# Patient Record
Sex: MALE | Race: WHITE | Employment: OTHER | URBAN - METROPOLITAN AREA
[De-identification: names, ages, dates, MRNs, and addresses within clinical notes are randomized per-mention and may not be internally consistent; named-entity substitution may affect disease eponyms.]

---

## 2020-01-26 ENCOUNTER — HOSPITAL ENCOUNTER (EMERGENCY)
Age: 25
Discharge: HOME | End: 2020-01-26
Attending: EMERGENCY MEDICINE

## 2020-01-26 ENCOUNTER — APPOINTMENT (OUTPATIENT)
Dept: GENERAL RADIOLOGY | Age: 25
End: 2020-01-26

## 2020-01-26 VITALS
TEMPERATURE: 98.3 F | SYSTOLIC BLOOD PRESSURE: 113 MMHG | BODY MASS INDEX: 22.5 KG/M2 | WEIGHT: 140 LBS | HEIGHT: 66 IN | HEART RATE: 88 BPM | RESPIRATION RATE: 13 BRPM | OXYGEN SATURATION: 98 % | DIASTOLIC BLOOD PRESSURE: 73 MMHG

## 2020-01-26 LAB
ABSOLUTE EOS #: 0 K/UL (ref 0–0.4)
ABSOLUTE IMMATURE GRANULOCYTE: ABNORMAL K/UL (ref 0–0.3)
ABSOLUTE LYMPH #: 0.5 K/UL (ref 1–4.8)
ABSOLUTE MONO #: 0.4 K/UL (ref 0.1–1.3)
ALBUMIN SERPL-MCNC: 4.5 G/DL (ref 3.5–5.2)
ALBUMIN/GLOBULIN RATIO: ABNORMAL (ref 1–2.5)
ALP BLD-CCNC: 70 U/L (ref 40–129)
ALT SERPL-CCNC: 9 U/L (ref 5–41)
ANION GAP SERPL CALCULATED.3IONS-SCNC: 18 MMOL/L (ref 9–17)
AST SERPL-CCNC: 21 U/L
BASOPHILS # BLD: 1 % (ref 0–2)
BASOPHILS ABSOLUTE: 0 K/UL (ref 0–0.2)
BILIRUB SERPL-MCNC: 0.48 MG/DL (ref 0.3–1.2)
BUN BLDV-MCNC: 19 MG/DL (ref 6–20)
BUN/CREAT BLD: ABNORMAL (ref 9–20)
CALCIUM SERPL-MCNC: 8.9 MG/DL (ref 8.6–10.4)
CHLORIDE BLD-SCNC: 98 MMOL/L (ref 98–107)
CO2: 19 MMOL/L (ref 20–31)
CREAT SERPL-MCNC: 0.85 MG/DL (ref 0.7–1.2)
DIFFERENTIAL TYPE: ABNORMAL
DIRECT EXAM: ABNORMAL
DIRECT EXAM: ABNORMAL
EOSINOPHILS RELATIVE PERCENT: 0 % (ref 0–4)
GFR AFRICAN AMERICAN: >60 ML/MIN
GFR NON-AFRICAN AMERICAN: >60 ML/MIN
GFR SERPL CREATININE-BSD FRML MDRD: ABNORMAL ML/MIN/{1.73_M2}
GFR SERPL CREATININE-BSD FRML MDRD: ABNORMAL ML/MIN/{1.73_M2}
GLUCOSE BLD-MCNC: 93 MG/DL (ref 70–99)
HCT VFR BLD CALC: 41.9 % (ref 41–53)
HEMOGLOBIN: 14.4 G/DL (ref 13.5–17.5)
IMMATURE GRANULOCYTES: ABNORMAL %
LIPASE: 15 U/L (ref 13–60)
LYMPHOCYTES # BLD: 11 % (ref 24–44)
Lab: ABNORMAL
MCH RBC QN AUTO: 31 PG (ref 26–34)
MCHC RBC AUTO-ENTMCNC: 34.2 G/DL (ref 31–37)
MCV RBC AUTO: 90.5 FL (ref 80–100)
MONOCYTES # BLD: 9 % (ref 1–7)
NRBC AUTOMATED: ABNORMAL PER 100 WBC
PDW BLD-RTO: 12.7 % (ref 11.5–14.9)
PLATELET # BLD: 111 K/UL (ref 150–450)
PLATELET ESTIMATE: ABNORMAL
PMV BLD AUTO: 8 FL (ref 6–12)
POTASSIUM SERPL-SCNC: 3.7 MMOL/L (ref 3.7–5.3)
RBC # BLD: 4.63 M/UL (ref 4.5–5.9)
RBC # BLD: ABNORMAL 10*6/UL
SEG NEUTROPHILS: 79 % (ref 36–66)
SEGMENTED NEUTROPHILS ABSOLUTE COUNT: 3.9 K/UL (ref 1.3–9.1)
SODIUM BLD-SCNC: 135 MMOL/L (ref 135–144)
SPECIMEN DESCRIPTION: ABNORMAL
TOTAL PROTEIN: 7.1 G/DL (ref 6.4–8.3)
WBC # BLD: 4.9 K/UL (ref 3.5–11)
WBC # BLD: ABNORMAL 10*3/UL

## 2020-01-26 PROCEDURE — 6370000000 HC RX 637 (ALT 250 FOR IP): Performed by: EMERGENCY MEDICINE

## 2020-01-26 PROCEDURE — 99284 EMERGENCY DEPT VISIT MOD MDM: CPT

## 2020-01-26 PROCEDURE — 71046 X-RAY EXAM CHEST 2 VIEWS: CPT

## 2020-01-26 PROCEDURE — 83690 ASSAY OF LIPASE: CPT

## 2020-01-26 PROCEDURE — 87804 INFLUENZA ASSAY W/OPTIC: CPT

## 2020-01-26 PROCEDURE — 80053 COMPREHEN METABOLIC PANEL: CPT

## 2020-01-26 PROCEDURE — 2580000003 HC RX 258: Performed by: EMERGENCY MEDICINE

## 2020-01-26 PROCEDURE — 36415 COLL VENOUS BLD VENIPUNCTURE: CPT

## 2020-01-26 PROCEDURE — 85025 COMPLETE CBC W/AUTO DIFF WBC: CPT

## 2020-01-26 RX ORDER — 0.9 % SODIUM CHLORIDE 0.9 %
1000 INTRAVENOUS SOLUTION INTRAVENOUS ONCE
Status: COMPLETED | OUTPATIENT
Start: 2020-01-26 | End: 2020-01-26

## 2020-01-26 RX ORDER — ONDANSETRON 4 MG/1
4 TABLET, ORALLY DISINTEGRATING ORAL EVERY 8 HOURS PRN
Qty: 20 TABLET | Refills: 0 | Status: SHIPPED | OUTPATIENT
Start: 2020-01-26 | End: 2020-01-28

## 2020-01-26 RX ORDER — ACETAMINOPHEN 500 MG
1000 TABLET ORAL ONCE
Status: COMPLETED | OUTPATIENT
Start: 2020-01-26 | End: 2020-01-26

## 2020-01-26 RX ADMIN — ACETAMINOPHEN 1000 MG: 500 TABLET, FILM COATED ORAL at 13:47

## 2020-01-26 RX ADMIN — SODIUM CHLORIDE 1000 ML: 9 INJECTION, SOLUTION INTRAVENOUS at 13:47

## 2020-01-26 SDOH — HEALTH STABILITY: MENTAL HEALTH: HOW OFTEN DO YOU HAVE A DRINK CONTAINING ALCOHOL?: NEVER

## 2020-01-26 ASSESSMENT — ENCOUNTER SYMPTOMS
DIARRHEA: 1
BACK PAIN: 0
BLOOD IN STOOL: 0
ABDOMINAL PAIN: 0
VOMITING: 1
COUGH: 1
CONSTIPATION: 0
COLOR CHANGE: 0
TROUBLE SWALLOWING: 0
SORE THROAT: 0
NAUSEA: 1
SHORTNESS OF BREATH: 0

## 2020-01-26 ASSESSMENT — PAIN DESCRIPTION - DESCRIPTORS: DESCRIPTORS: ACHING

## 2020-01-26 ASSESSMENT — PAIN SCALES - GENERAL: PAINLEVEL_OUTOF10: 10

## 2020-01-26 ASSESSMENT — PAIN DESCRIPTION - LOCATION: LOCATION: GENERALIZED

## 2020-01-26 NOTE — ED PROVIDER NOTES
[address]  eMERGENCY dEPARTMENT eNCOUnter    Pt Name: Marva Grajeda  MRN: [id number]  YOB: 1995  Date of evaluation:1/26/20  PCP: Elizabeth Montgomery PA-C    CHIEF COMPLAINT       Chief Complaint   Patient presents with    Abdominal Pain    Chest Pain    Generalized Body Aches    Sweats    Emesis     black    Constipation    Cough     brown/green sputum       HISTORY OF PRESENT ILLNESS    Marva Grajeda is a 21 y.o. male who presents with a chief complaint of generalized body aches, cough and nausea and vomiting. Symptoms been going on for the past 4 days. Denies any actual pain in his abdomen. Also has had some loose stools. No blood in his stools or diarrhea. Also was feeling very hot and cold over the past several days but he is unsure if he had a fever. Cough has been productive of green sputum. Symptoms are acute. Symptoms are moderate. Nothing makes symptoms better or worse. Patient has no other complaints at this time. REVIEW OF SYSTEMS       Review of Systems   Constitutional: Positive for chills and fatigue. Negative for fever. HENT: Positive for congestion. Negative for ear pain, sore throat and trouble swallowing. Eyes: Negative for visual disturbance. Respiratory: Positive for cough. Negative for shortness of breath. Cardiovascular: Negative for chest pain, palpitations and leg swelling. Gastrointestinal: Positive for diarrhea, nausea and vomiting. Negative for abdominal pain, blood in stool and constipation. Genitourinary: Negative for dysuria and flank pain. Musculoskeletal: Positive for myalgias. Negative for arthralgias, back pain and neck pain. Skin: Negative for color change, rash and wound. Neurological: Negative for dizziness, weakness, light-headedness, numbness and headaches. Psychiatric/Behavioral: Negative for confusion. All other systems reviewed and are negative.     Negativein 10 essential Systems except as mentioned Psychiatric:         Judgment: Judgment normal.           DIFFERENTIAL DIAGNOSIS/MDM:   43-year-old female presents with multiple complaints as above for the past 5 days. Clinically he looks very well. He is afebrile, nontoxic with normal vital signs. Not in acute distress. Has no abdominal tenderness on my exam.  He does have slightly dry mucous membranes. I am concerned for mild dehydration. We will give IV fluids, antiemetics. Will check blood work, influenza screen, chest x-ray as he is having a productive cough. DIAGNOSTIC RESULTS     EKG: All EKG's are interpreted by the Emergency Department Physician who either signs or Co-signs this chart in the absence of a cardiologist.        RADIOLOGY:   I directly visualized the following  images and reviewed the radiologist interpretations:  XR CHEST STANDARD (2 VW)   Final Result   No acute cardiopulmonary process.                  ED BEDSIDE ULTRASOUND:      LABS:  Labs Reviewed   RAPID INFLUENZA A/B ANTIGENS - Abnormal; Notable for the following components:       Result Value    Direct Exam POSITIVE for Influenza B Antigen (*)     All other components within normal limits   CBC WITH AUTO DIFFERENTIAL - Abnormal; Notable for the following components:    Platelets 320 (*)     Seg Neutrophils 79 (*)     Lymphocytes 11 (*)     Monocytes 9 (*)     Absolute Lymph # 0.50 (*)     All other components within normal limits   COMPREHENSIVE METABOLIC PANEL - Abnormal; Notable for the following components:    CO2 19 (*)     Anion Gap 18 (*)     All other components within normal limits   LIPASE   URINE RT REFLEX TO CULTURE         EMERGENCY DEPARTMENT COURSE:   Vitals:    Vitals:    01/26/20 1325 01/26/20 1330 01/26/20 1345 01/26/20 1400   BP: (!) 125/57 120/70 118/77 113/73   Pulse: 95 89 90 88   Resp: 20 15 17 13   Temp: 98.3 °F (36.8 °C)      TempSrc: Oral      SpO2: 97% 97% 97% 98%   Weight: 140 lb (63.5 kg)      Height: 5' 6\" (1.676 m)        3:36 PM  Influenza

## 2020-01-28 ENCOUNTER — HOSPITAL ENCOUNTER (EMERGENCY)
Age: 25
Discharge: HOME | End: 2020-01-28
Attending: SPECIALIST

## 2020-01-28 ENCOUNTER — APPOINTMENT (OUTPATIENT)
Dept: GENERAL RADIOLOGY | Age: 25
End: 2020-01-28

## 2020-01-28 VITALS
DIASTOLIC BLOOD PRESSURE: 90 MMHG | HEIGHT: 66 IN | OXYGEN SATURATION: 98 % | SYSTOLIC BLOOD PRESSURE: 119 MMHG | TEMPERATURE: 99.1 F | BODY MASS INDEX: 22.5 KG/M2 | RESPIRATION RATE: 16 BRPM | WEIGHT: 140 LBS | HEART RATE: 87 BPM

## 2020-01-28 LAB
ABSOLUTE EOS #: 0 K/UL (ref 0–0.4)
ABSOLUTE IMMATURE GRANULOCYTE: ABNORMAL K/UL (ref 0–0.3)
ABSOLUTE LYMPH #: 0.8 K/UL (ref 1–4.8)
ABSOLUTE MONO #: 0.5 K/UL (ref 0.1–1.2)
ALBUMIN SERPL-MCNC: 4.2 G/DL (ref 3.5–5.2)
ALBUMIN/GLOBULIN RATIO: 1.4 (ref 1–2.5)
ALP BLD-CCNC: 65 U/L (ref 40–129)
ALT SERPL-CCNC: 10 U/L (ref 5–41)
ANION GAP SERPL CALCULATED.3IONS-SCNC: 12 MMOL/L (ref 9–17)
AST SERPL-CCNC: 22 U/L
BASOPHILS # BLD: 0 % (ref 0–2)
BASOPHILS ABSOLUTE: 0 K/UL (ref 0–0.2)
BILIRUB SERPL-MCNC: 0.28 MG/DL (ref 0.3–1.2)
BUN BLDV-MCNC: 16 MG/DL (ref 6–20)
BUN/CREAT BLD: ABNORMAL (ref 9–20)
CALCIUM SERPL-MCNC: 8.7 MG/DL (ref 8.6–10.4)
CHLORIDE BLD-SCNC: 100 MMOL/L (ref 98–107)
CO2: 24 MMOL/L (ref 20–31)
CREAT SERPL-MCNC: 0.72 MG/DL (ref 0.7–1.2)
DIFFERENTIAL TYPE: ABNORMAL
EOSINOPHILS RELATIVE PERCENT: 0 % (ref 1–4)
GFR AFRICAN AMERICAN: >60 ML/MIN
GFR NON-AFRICAN AMERICAN: >60 ML/MIN
GFR SERPL CREATININE-BSD FRML MDRD: ABNORMAL ML/MIN/{1.73_M2}
GFR SERPL CREATININE-BSD FRML MDRD: ABNORMAL ML/MIN/{1.73_M2}
GLUCOSE BLD-MCNC: 104 MG/DL (ref 70–99)
HCT VFR BLD CALC: 43.3 % (ref 41–53)
HEMOGLOBIN: 14.8 G/DL (ref 13.5–17.5)
IMMATURE GRANULOCYTES: ABNORMAL %
LIPASE: 24 U/L (ref 13–60)
LYMPHOCYTES # BLD: 16 % (ref 24–44)
MCH RBC QN AUTO: 30.7 PG (ref 26–34)
MCHC RBC AUTO-ENTMCNC: 34.2 G/DL (ref 31–37)
MCV RBC AUTO: 89.8 FL (ref 80–100)
MONOCYTES # BLD: 11 % (ref 2–11)
NRBC AUTOMATED: ABNORMAL PER 100 WBC
PDW BLD-RTO: 12.6 % (ref 12.5–15.4)
PLATELET # BLD: 108 K/UL (ref 140–450)
PLATELET ESTIMATE: ABNORMAL
PMV BLD AUTO: 8.3 FL (ref 6–12)
POTASSIUM SERPL-SCNC: 4 MMOL/L (ref 3.7–5.3)
RBC # BLD: 4.82 M/UL (ref 4.5–5.9)
RBC # BLD: ABNORMAL 10*6/UL
SEG NEUTROPHILS: 73 % (ref 36–66)
SEGMENTED NEUTROPHILS ABSOLUTE COUNT: 3.6 K/UL (ref 1.8–7.7)
SODIUM BLD-SCNC: 136 MMOL/L (ref 135–144)
TOTAL PROTEIN: 7.2 G/DL (ref 6.4–8.3)
WBC # BLD: 5 K/UL (ref 3.5–11)
WBC # BLD: ABNORMAL 10*3/UL

## 2020-01-28 PROCEDURE — 71046 X-RAY EXAM CHEST 2 VIEWS: CPT

## 2020-01-28 PROCEDURE — 80053 COMPREHEN METABOLIC PANEL: CPT

## 2020-01-28 PROCEDURE — 85025 COMPLETE CBC W/AUTO DIFF WBC: CPT

## 2020-01-28 PROCEDURE — 6360000002 HC RX W HCPCS: Performed by: PHYSICIAN ASSISTANT

## 2020-01-28 PROCEDURE — 99284 EMERGENCY DEPT VISIT MOD MDM: CPT

## 2020-01-28 PROCEDURE — 6370000000 HC RX 637 (ALT 250 FOR IP): Performed by: PHYSICIAN ASSISTANT

## 2020-01-28 PROCEDURE — 83690 ASSAY OF LIPASE: CPT

## 2020-01-28 PROCEDURE — 36415 COLL VENOUS BLD VENIPUNCTURE: CPT

## 2020-01-28 PROCEDURE — 96372 THER/PROPH/DIAG INJ SC/IM: CPT

## 2020-01-28 RX ORDER — AZITHROMYCIN 250 MG/1
500 TABLET, FILM COATED ORAL ONCE
Status: COMPLETED | OUTPATIENT
Start: 2020-01-28 | End: 2020-01-28

## 2020-01-28 RX ORDER — DICYCLOMINE HYDROCHLORIDE 10 MG/ML
20 INJECTION INTRAMUSCULAR ONCE
Status: COMPLETED | OUTPATIENT
Start: 2020-01-28 | End: 2020-01-28

## 2020-01-28 RX ORDER — ACETAMINOPHEN AND CODEINE PHOSPHATE 120; 12 MG/5ML; MG/5ML
5-10 SOLUTION ORAL EVERY 6 HOURS PRN
Qty: 90 ML | Refills: 0 | Status: SHIPPED | OUTPATIENT
Start: 2020-01-28 | End: 2020-01-31

## 2020-01-28 RX ORDER — PREDNISONE 20 MG/1
60 TABLET ORAL ONCE
Status: COMPLETED | OUTPATIENT
Start: 2020-01-28 | End: 2020-01-28

## 2020-01-28 RX ORDER — DICYCLOMINE HYDROCHLORIDE 10 MG/1
10 CAPSULE ORAL EVERY 6 HOURS PRN
Qty: 20 CAPSULE | Refills: 0 | Status: SHIPPED | OUTPATIENT
Start: 2020-01-28

## 2020-01-28 RX ORDER — BENZONATATE 100 MG/1
100 CAPSULE ORAL 3 TIMES DAILY PRN
Qty: 30 CAPSULE | Refills: 1 | Status: SHIPPED | OUTPATIENT
Start: 2020-01-28 | End: 2020-02-04

## 2020-01-28 RX ORDER — AZITHROMYCIN 250 MG/1
250 TABLET, FILM COATED ORAL DAILY
Qty: 4 TABLET | Refills: 0 | Status: SHIPPED | OUTPATIENT
Start: 2020-01-29 | End: 2020-02-02

## 2020-01-28 RX ORDER — ALBUTEROL SULFATE 2.5 MG/3ML
2.5 SOLUTION RESPIRATORY (INHALATION) ONCE
Status: COMPLETED | OUTPATIENT
Start: 2020-01-28 | End: 2020-01-28

## 2020-01-28 RX ADMIN — AZITHROMYCIN 500 MG: 250 TABLET, FILM COATED ORAL at 21:47

## 2020-01-28 RX ADMIN — PREDNISONE 60 MG: 20 TABLET ORAL at 20:01

## 2020-01-28 RX ADMIN — DICYCLOMINE HYDROCHLORIDE 20 MG: 20 INJECTION, SOLUTION INTRAMUSCULAR at 21:47

## 2020-01-28 RX ADMIN — ALBUTEROL SULFATE 2.5 MG: 2.5 SOLUTION RESPIRATORY (INHALATION) at 20:05

## 2020-01-28 ASSESSMENT — PAIN DESCRIPTION - PAIN TYPE: TYPE: ACUTE PAIN

## 2020-01-28 ASSESSMENT — PAIN SCALES - GENERAL: PAINLEVEL_OUTOF10: 10

## 2020-01-28 ASSESSMENT — PAIN DESCRIPTION - LOCATION: LOCATION: ABDOMEN;THROAT

## 2020-01-29 NOTE — ED PROVIDER NOTES
Emergency Department     Faculty Attestation    I performed a history and physical examination of the patient and discussed management with the mid level provideer. I reviewed the mid level provider's note and agree with the documented findings and plan of care. Any areas of disagreement are noted on the chart. I was personally present for the key portions of any procedures. I have documented in the chart those procedures where I was not present during the key portions. I have reviewed the emergency nurses triage note. I agree with the chief complaint, past medical history, past surgical history, allergies, medications, social and family history as documented unless otherwise noted below. Documentation of the HPI, Physical Exam and Medical Decision Making performed by medical students or scribes is based on my personal performance of the HPI, PE and MDM. For Physician Assistant/ Nurse Practitioner cases/documentation I have personally evaluated this patient and have completed at least one if not all key elements of the E/M (history, physical exam, and MDM). Additional findings are as noted.       Primary Care Physician:  Jose E Sam PA-C    CHIEF COMPLAINT       Chief Complaint   Patient presents with    Abdominal Pain    Pharyngitis     main reason for pt visit, reports at [facility] 2 days ago + influenza       RECENT VITALS:   Temp: 99.1 °F (37.3 °C),  Pulse: 87, Resp: 16, BP: (!) 119/90    LABS:  Labs Reviewed   CBC WITH AUTO DIFFERENTIAL - Abnormal; Notable for the following components:       Result Value    Platelets 081 (*)     Seg Neutrophils 73 (*)     Lymphocytes 16 (*)     Eosinophils % 0 (*)     Absolute Lymph # 0.80 (*)     All other components within normal limits   COMPREHENSIVE METABOLIC PANEL W/ REFLEX TO MG FOR LOW K - Abnormal; Notable for the following components:    Glucose 104 (*)     Total Bilirubin 0.28 (*)     All other components within normal limits   LIPASE         PERTINENT ATTENDING PHYSICIAN COMMENTS:    48-year-old male patient presents to the emergency department complaining of abdominal pain, cough, sore throat since last 6 days. Symptoms have gotten worse over last 2 days prior to arrival.  Patient was seen at [facility] 2 days ago and was diagnosed to have influenza B. He denies any fever, chills, nausea, vomiting, diarrhea, chest pain, lightheadedness or dizziness. Patient has low-grade temp with temperature 99.1 °F, vital signs are stable. Pulse oximetry is 98% on room air. His lungs are clear to auscultation, rhythm is regular without murmurs. Abdomen is soft with mild lower abdominal and epigastric tenderness. There is no rebound tenderness. He has active bowel sounds. Bud Holley sign is negative and there is no tenderness at the McBurney's point. There is no CVA tenderness. 2 view chest x-ray reveals an area of developing atelectasis or pneumonia. Plan is to discharge the patient with instructions to take azithromycin, Tessalon Perles as needed for the cough, Bentyl as needed for the pain and Tylenol with codeine cough syrup as needed, follow-up with PCP, return if worse.          Musa Novoa MD  01/29/20 0272

## 2020-01-29 NOTE — ED PROVIDER NOTES
(*)     Eosinophils % 0 (*)     Absolute Lymph # 0.80 (*)     All other components within normal limits   COMPREHENSIVE METABOLIC PANEL W/ REFLEX TO MG FOR LOW K - Abnormal; Notable for the following components:    Glucose 104 (*)     Total Bilirubin 0.28 (*)     All other components within normal limits   LIPASE   URINE RT REFLEX TO CULTURE         EMERGENCY DEPARTMENT COURSE, DDX and MDM:     1948  Pt here with confirmed Flu B, however worsening abd pain, cough and ST since being evaluated at [facility] ED 2 days ago. No fevers/vomiting/diarrhea. 2120  Zpak, T#3 and T. Perles for cough and pt can try Bentyl for stomach cramping pain. I have reviewed the disposition diagnosis with the patient and or their family/guardian. I have answered their questions and given discharge instructions. They voiced understanding of these instructions and did not have any further questions or complaints. I estimate there is LOW risk for ACUTE APPENDICITIS, BOWEL OBSTRUCTION, CHOLECYSTITIS, DIVERTICULITIS, INCARCERATED HERNIA, PANCREATITIS, or PERFORATED BOWEL or ULCER, thus I consider the discharge disposition reasonable. Re-evaluation of the abdomen is benign. No guarding, peritoneal signs or significant tenderness noted. Also, there is no evidence or peritonitis, sepsis, or toxicity. The patient and/or family and I have discussed the diagnosis and risks, and we agree with discharging home to follow-up with their primary doctor. The patient presents with abdominal pain without signs of peritonitis or other life-threatening or serious etiology. The patient appears stable for discharge and has been instructed to return immediately if the symptoms worsen in any way, or in 8-12 hr if not improved for re-evaluation. We also discussed returning to the Emergency Department immediately if new or worsening symptoms occur.  We have discussed the symptoms which are most concerning (e.g., bloody stool, fever, changing or DISPOSITION/PLAN:  DISPOSITION Decision To Discharge 01/28/2020 09:15:29 PM        PATIENT REFERRED TO:  Cielo Michael PA-C  [address] [facility]  [phone]    Schedule an appointment as soon as possible for a visit in 3 days  for re-evaluation of your symptoms    [facility]  [address] [address]  [phone]  Go to   for worsening of your symptoms      DISCHARGE MEDICATIONS:  New Prescriptions    ACETAMINOPHEN-CODEINE 120-12 MG/5ML SOLUTION    Take 5-10 mLs by mouth every 6 hours as needed for Pain for up to 3 days.     AZITHROMYCIN (ZITHROMAX) 250 MG TABLET    Take 1 tablet by mouth daily for 4 days    BENZONATATE (TESSALON PERLES) 100 MG CAPSULE    Take 1 capsule by mouth 3 times daily as needed for Cough    DICYCLOMINE (BENTYL) 10 MG CAPSULE    Take 1 capsule by mouth every 6 hours as needed (cramps)       (Please note that portions of this note were completed with a voice recognition program.  Efforts were made to edit the dictations but occasionally words are mis-transcribed.)    WALTER Miranda PA-C  01/28/20 9165

## 2022-03-15 ENCOUNTER — HOSPITAL ENCOUNTER (EMERGENCY)
Age: 27
Discharge: HOME | End: 2022-03-15
Attending: EMERGENCY MEDICINE

## 2022-03-15 VITALS
RESPIRATION RATE: 14 BRPM | DIASTOLIC BLOOD PRESSURE: 82 MMHG | HEART RATE: 87 BPM | TEMPERATURE: 98.2 F | HEIGHT: 67 IN | WEIGHT: 130 LBS | BODY MASS INDEX: 20.4 KG/M2 | OXYGEN SATURATION: 98 % | SYSTOLIC BLOOD PRESSURE: 124 MMHG

## 2022-03-15 DIAGNOSIS — R36.9 PENILE DISCHARGE: Primary | ICD-10-CM

## 2022-03-15 LAB
BACTERIA: NORMAL
BILIRUBIN URINE: NEGATIVE
CASTS UA: NORMAL /LPF
COLOR: YELLOW
EPITHELIAL CELLS UA: NORMAL /HPF
GLUCOSE URINE: NEGATIVE
KETONES, URINE: NEGATIVE
LEUKOCYTE ESTERASE, URINE: ABNORMAL
NITRITE, URINE: NEGATIVE
PH UA: 6.5 (ref 5–8)
PROTEIN UA: NEGATIVE
RBC UA: NORMAL /HPF
SPECIFIC GRAVITY UA: 1.02 (ref 1–1.03)
TURBIDITY: CLEAR
URINE HGB: NEGATIVE
UROBILINOGEN, URINE: NORMAL
WBC UA: NORMAL /HPF

## 2022-03-15 PROCEDURE — 87491 CHLMYD TRACH DNA AMP PROBE: CPT

## 2022-03-15 PROCEDURE — 6360000002 HC RX W HCPCS: Performed by: PHYSICIAN ASSISTANT

## 2022-03-15 PROCEDURE — 87086 URINE CULTURE/COLONY COUNT: CPT

## 2022-03-15 PROCEDURE — 81001 URINALYSIS AUTO W/SCOPE: CPT

## 2022-03-15 PROCEDURE — 87591 N.GONORRHOEAE DNA AMP PROB: CPT

## 2022-03-15 PROCEDURE — 96372 THER/PROPH/DIAG INJ SC/IM: CPT

## 2022-03-15 PROCEDURE — 99283 EMERGENCY DEPT VISIT LOW MDM: CPT

## 2022-03-15 RX ORDER — DOXYCYCLINE HYCLATE 100 MG
100 TABLET ORAL 2 TIMES DAILY
Qty: 14 TABLET | Refills: 0 | Status: SHIPPED | OUTPATIENT
Start: 2022-03-15 | End: 2022-03-22

## 2022-03-15 RX ORDER — CEFTRIAXONE 1 G/1
500 INJECTION, POWDER, FOR SOLUTION INTRAMUSCULAR; INTRAVENOUS ONCE
Status: COMPLETED | OUTPATIENT
Start: 2022-03-15 | End: 2022-03-15

## 2022-03-15 RX ADMIN — CEFTRIAXONE SODIUM 500 MG: 1 INJECTION, POWDER, FOR SOLUTION INTRAMUSCULAR; INTRAVENOUS at 13:16

## 2022-03-15 NOTE — ED PROVIDER NOTES
[address]  eMERGENCY dEPARTMENT eNCOUnter      Pt Name: Maryjane Marques  MRN: [id number]  [place] 1995  Date of evaluation: 3/15/2022  Provider: Ritchie Schneider PA-C    CHIEF COMPLAINT       Chief Complaint   Patient presents with    Exposure to STD     Discharge- white 2-3 weeks           HISTORY OF PRESENT ILLNESS  (Location/Symptom, Timing/Onset, Context/Setting, Quality, Duration, Modifying Factors, Severity.)   Maryjane Marques is a 31 y.o. male who presents to the emergency department requesting STD evaluation. Pt currently reports penile discharge x 1 week. Pt denies abdominal pain, nausea, emesis, testicular pain, rashes, lesions, fevers, chills, back pain. Pt would like treated. No other complaints. Nursing Notes were reviewed. REVIEW OF SYSTEMS    (2-9 systems for level 4, 10 or more for level 5)     Review of Systems   Constitutional: Negative. HENT: Negative. Eyes: Negative. Respiratory: Negative. Cardiovascular: Negative. Gastrointestinal: Negative. Musculoskeletal: Negative. Endocrine: Negative. Genitourinary: penile discharge   Skin: Negative. Allergic/Immunologic: Negative. Neurological: Negative. Hematological: Negative. Psychiatric/Behavioral: Negative. Except as noted above the remainder of the review of systems was reviewed and negative. PAST MEDICAL HISTORY   History reviewed. No pertinent past medical history. None otherwise stated in nurses notes    SURGICAL HISTORY       Past Surgical History:   Procedure Laterality Date    ADENOIDECTOMY       None otherwise stated in nurses notes    CURRENT MEDICATIONS       Previous Medications    DICYCLOMINE (BENTYL) 10 MG CAPSULE    Take 1 capsule by mouth every 6 hours as needed (cramps)       ALLERGIES     Patient has no known allergies. FAMILY HISTORY     History reviewed. No pertinent family history. No family status information on file.       None otherwise stated in nurses notes    SOCIAL HISTORY      reports that he has been smoking cigarettes. He has been smoking about 0.75 packs per day. He has never used smokeless tobacco. He reports current alcohol use. He reports current drug use. Drug: Marijuana [place]). lives at home with others     PHYSICAL EXAM    (up to 7 for level 4, 8 or more for level 5)     ED Triage Vitals [03/15/22 1238]   BP Temp Temp src Pulse Resp SpO2 Height Weight   124/82 98.2 °F (36.8 °C) -- 87 14 98 % 5' 7\" (1.702 m) 130 lb (59 kg)       Physical Exam   Nursing note and vitals reviewed. Constitutional: well-developed, well-nourished, nontoxic, well appearing, not distressed  HEENT:  normocephalic atraumatic, external ears normal appearance, no nasal deformity, no neck masses or edema, patient protecting airway, no stridor, phonating well  Eyes: pupils equal, sclera non-icteric, no discharge  Cardiovascular: no JVD  Respiratory: non-labored breathing, effort normal, no accessory muscle use visulized, no audible wheezing  Gastrointestinal: Abdomen not distended  Musculoskeletal: moves extremities without impaired range of motion, no deformity, no edema  Skin: no pallor, no rashes visible  Neuro: alert and oriented times 3, GCS 15, normal coordination, no dysarthria or aphasia  Psych: normal mood and affect, cooperative, normal thought content              DIAGNOSTIC RESULTS     LABS:  Labs Reviewed   C.TRACHOMATIS N.GONORRHOEAE DNA, URINE   URINALYSIS WITH REFLEX TO CULTURE       All other labs were within normal range or not returned as of this dictation.     EMERGENCY DEPARTMENT COURSE and DIFFERENTIAL DIAGNOSIS/MDM:   Vitals:    Vitals:    03/15/22 1238   BP: 124/82   Pulse: 87   Resp: 14   Temp: 98.2 °F (36.8 °C)   SpO2: 98%   Weight: 130 lb (59 kg)   Height: 5' 7\" (1.702 m)       ED MEDS:  Orders Placed This Encounter   Medications    cefTRIAXone (ROCEPHIN) injection 500 mg     Order Specific Question:   Antimicrobial Indications     Answer:   STD infection    doxycycline hyclate (VIBRA-TABS) 100 MG tablet     Sig: Take 1 tablet by mouth 2 times daily for 7 days     Dispense:  14 tablet     Refill:  0       Penile discharge x 1 week. Would like treated for gonorrhea and chlamydia. Will call with positive results. Discussed results and plan with the pt. They expressed appropriate understanding. Pt given close follow up, supportive care instructions and strict return instructions at the bedside. PATIENT INSTRUCTED THAT TODAYS TEST WITH CHECK FOR GONORRHEA AND CHLAMYDIA; RESULTS WILL TAKE 3-4 DAYS TO RETURN; INSTRUCTED THAT WILL BE NOTIFIED ONLY WITH POSITIVE RESULT; INSTRUCTED THAT TESTING DOES NOT INCLUDE HIV, HEPATITIS, SYPHILLIS, HPV/WARTS, OR HERPES; IF CONCERN FOR THESE OTHER INFECTIONS SHOULD FOLLOW UP WITH PCP, HEALTH DEPARTMENT OR OTHER STI CLINIC. INSTRUCTED ALL RECENT SEXUAL PARTNERS SHOULD BE SEEN BY THEIR PCP AND EVALUATED FOR STI'S. SHOULD SUSTAIN FROM PARTNERED SEXUAL ACTIVITY UNTIL RESULTS COME BACK AND FURTHER EVALUATION/TESTING. Patient instructed to return to the emergency room if symptoms worsen, return, or any other concern right away which is agreed by the patient          CONSULTS:  None    PROCEDURES:  None      FINAL IMPRESSION      1.  Penile discharge          DISPOSITION/PLAN   DISPOSITION     PATIENT REFERRED TO:  [facility] SPECIAL[facility] SURGERY  [facility] [address]  [address] [facility] [address]  [phone]  Call       [facility] ED  [facility] [address]:  New Prescriptions    DOXYCYCLINE HYCLATE (VIBRA-TABS) 100 MG TABLET    Take 1 tablet by mouth 2 times daily for 7 days       (Please note that portions of this note were completed with a voice recognition program.  Efforts were made to edit the dictations but occasionally words are mis-transcribed.)    Louis Schmidt [address] Acevedo PA-C  03/15/22 5559

## 2022-03-16 LAB
C. TRACHOMATIS DNA ,URINE: NEGATIVE
CULTURE: NORMAL
N. GONORRHOEAE DNA, URINE: NEGATIVE
SPECIMEN DESCRIPTION: NORMAL
SPECIMEN DESCRIPTION: NORMAL

## 2022-03-16 NOTE — ED PROVIDER NOTES
[address]  eMERGENCY dEPARTMENT eNCOUnter   Independent Attestation     Pt Name: Barbara Razo  MRN: [id number]  [place] 1995  Date of evaluation: 3/15/22   Barbara Razo is a 31 y.o. male who presents with Exposure to STD (Discharge- white 2-3 weeks)    Vitals:   Vitals:    03/15/22 1238   BP: 124/82   Pulse: 87   Resp: 14   Temp: 98.2 °F (36.8 °C)   SpO2: 98%   Weight: 130 lb (59 kg)   Height: 5' 7\" (1.702 m)     Impression:   1. Penile discharge      I was personally available for consultation in the Emergency Department. I have reviewed the chart and agree with the documentation as recorded by the [facility], including the assessment, treatment plan and disposition.   Henry Cordero MD  Attending Emergency  Physician                  Henry Cordero MD  03/15/22 5157

## 2022-12-11 ENCOUNTER — HOSPITAL ENCOUNTER (EMERGENCY)
Age: 27
Discharge: HOME | End: 2022-12-11
Attending: EMERGENCY MEDICINE
Payer: MEDICAID

## 2022-12-11 VITALS
OXYGEN SATURATION: 98 % | TEMPERATURE: 98.2 F | DIASTOLIC BLOOD PRESSURE: 68 MMHG | WEIGHT: 140 LBS | BODY MASS INDEX: 22.5 KG/M2 | HEART RATE: 99 BPM | RESPIRATION RATE: 18 BRPM | HEIGHT: 66 IN | SYSTOLIC BLOOD PRESSURE: 119 MMHG

## 2022-12-11 DIAGNOSIS — H66.92 LEFT OTITIS MEDIA, UNSPECIFIED OTITIS MEDIA TYPE: Primary | ICD-10-CM

## 2022-12-11 PROCEDURE — 99283 EMERGENCY DEPT VISIT LOW MDM: CPT

## 2022-12-11 RX ORDER — AMOXICILLIN 875 MG/1
875 TABLET, COATED ORAL 2 TIMES DAILY
Qty: 14 TABLET | Refills: 0 | Status: SHIPPED | OUTPATIENT
Start: 2022-12-11 | End: 2022-12-18

## 2022-12-11 RX ORDER — AMOXICILLIN 875 MG/1
875 TABLET, COATED ORAL 2 TIMES DAILY
Qty: 14 TABLET | Refills: 0 | Status: SHIPPED | OUTPATIENT
Start: 2022-12-11 | End: 2022-12-11 | Stop reason: SDUPTHER

## 2022-12-11 ASSESSMENT — ENCOUNTER SYMPTOMS
EYES NEGATIVE: 1
TROUBLE SWALLOWING: 0
SORE THROAT: 0
VOICE CHANGE: 0
RESPIRATORY NEGATIVE: 1
SINUS PAIN: 0
RHINORRHEA: 0
SINUS PRESSURE: 0
GASTROINTESTINAL NEGATIVE: 1
FACIAL SWELLING: 0

## 2022-12-11 ASSESSMENT — PAIN DESCRIPTION - ORIENTATION: ORIENTATION: LEFT

## 2022-12-11 ASSESSMENT — PAIN SCALES - GENERAL: PAINLEVEL_OUTOF10: 6

## 2022-12-11 ASSESSMENT — PAIN - FUNCTIONAL ASSESSMENT: PAIN_FUNCTIONAL_ASSESSMENT: NONE - DENIES PAIN

## 2022-12-11 ASSESSMENT — PAIN DESCRIPTION - DESCRIPTORS: DESCRIPTORS: DISCOMFORT

## 2022-12-11 ASSESSMENT — PAIN DESCRIPTION - LOCATION: LOCATION: EAR

## 2022-12-11 NOTE — ED PROVIDER NOTES
I was present in the ED during this patient's evaluation and management by the Advance Practice Provider and was available to address any concerns about their medical management.     Brook Garcia MD  Attending, Emergency Department       Serena Hernandez MD  12/11/22 6120

## 2022-12-11 NOTE — ED PROVIDER NOTES
[facility] Emergency Department  [id number] [address] [facility] [address] \[facility] [place] [facility]\"" [address]  Phone: [phone]  Fax: [phone]        Pt Name: Tracey Brewer  MRN: [id number]  [place] 1995  Date of evaluation: 12/11/22    CHIEFCOMPLAINT       Chief Complaint   Patient presents with    Ear Fullness     Pt. States having left ear drainage and fullness that started yesterday. Took ibuprofen this morning         HISTORY OF PRESENT ILLNESS (Location/Symptom, Timing/Onset, Context/Setting, Quality, Duration, Modifying Factors, Severity)      Tracey Brewer is a 31 y.o. male with no pertinent PMH who presents to the ED via private auto with complaints of pain in his left ear, fullness, and increase in wax. He took some ibuprofen for symptoms with some relief. He states he was recently sick with an upper respiratory infection, though symptoms have much improved but now he has pain in his left ear. His symptoms started yesterday. Denies any fever chills or body aches. Denies any headache dizziness or lightheadedness. He states sound in his left ear a little muffled, but otherwise no change in hearing. Denies any chest pain or shortness of breath. Denies any nausea vomiting diarrhea. Denies any other symptoms at this time. PAST MEDICAL / SURGICAL / SOCIAL / FAMILY HISTORY     PMH:  has no past medical history on file. Surgical History:  has a past surgical history that includes Adenoidectomy. Social History:  reports that he has been smoking cigarettes. He has been smoking an average of .5 packs per day. He has never used smokeless tobacco. He reports current alcohol use. He reports current drug use. Drug: Marijuana Jose C Goldberg). Family History: has no family status information on file. family history is not on file. Psychiatric History: None    Allergies: Patient has no known allergies. Home Medications:   Prior to Admission medications    Medication Sig Start Date End Date Taking? Authorizing Provider   amoxicillin (AMOXIL) 875 MG tablet Take 1 tablet by mouth 2 times daily for 7 days 12/11/22 12/18/22 Yes MARISABEL Stevens NP   dicyclomine (BENTYL) 10 MG capsule Take 1 capsule by mouth every 6 hours as needed (cramps) 1/28/20   Jj Posey PA-C       REVIEW OF SYSTEMS  (2-9 systems for level 4, 10 ormore for level 5)      Review of Systems   Constitutional: Negative. HENT:  Positive for ear pain. Negative for congestion, dental problem, drooling, ear discharge, facial swelling, hearing loss, mouth sores, nosebleeds, postnasal drip, rhinorrhea, sinus pressure, sinus pain, sneezing, sore throat, tinnitus, trouble swallowing and voice change. Left ear pain   Eyes: Negative. Respiratory: Negative. Cardiovascular: Negative. Gastrointestinal: Negative. Endocrine: Negative. Genitourinary: Negative. Musculoskeletal: Negative. Skin: Negative. Neurological: Negative. Hematological: Negative. Psychiatric/Behavioral: Negative. All other systems negative except as marked. PHYSICAL EXAM  (up to 7 for level 4, 8 or more for level 5)      INITIAL VITALS:  height is 5' 6\" (1.676 m) and weight is 63.5 kg (140 lb). His oral temperature is 98.2 °F (36.8 °C). His blood pressure is 119/68 and his pulse is 99. His respiration is 18 and oxygen saturation is 98%. Vital signs reviewed. Physical Exam  Constitutional:       Appearance: Normal appearance. HENT:      Head: Normocephalic. Right Ear: Tympanic membrane, ear canal and external ear normal.      Left Ear: Ear canal and external ear normal.      Ears:      Comments: Left TM very erythemic and bulging; no perforation noted; no tragus or mastoid bone tenderness bilaterally; no cerumen impaction, but there is moderate wax noted in the EAC bilaterally     Nose: Nose normal.      Mouth/Throat:      Mouth: Mucous membranes are moist.      Pharynx: Oropharynx is clear.    Eyes:      Extraocular Movements: Extraocular movements intact. Conjunctiva/sclera: Conjunctivae normal.      Pupils: Pupils are equal, round, and reactive to light. Cardiovascular:      Rate and Rhythm: Normal rate and regular rhythm. Pulses: Normal pulses. Heart sounds: Normal heart sounds. Pulmonary:      Effort: Pulmonary effort is normal.      Breath sounds: Normal breath sounds. Abdominal:      General: Bowel sounds are normal. There is no distension. Palpations: Abdomen is soft. Tenderness: There is no abdominal tenderness. There is no guarding. Musculoskeletal:         General: Normal range of motion. Cervical back: Normal range of motion. Lymphadenopathy:      Cervical: No cervical adenopathy. Skin:     General: Skin is warm and dry. Capillary Refill: Capillary refill takes less than 2 seconds. Neurological:      Mental Status: He is alert and oriented to person, place, and time. Psychiatric:         Mood and Affect: Mood normal.         Behavior: Behavior normal.         Thought Content: Thought content normal.         Judgment: Judgment normal.         DIFFERENTIAL DIAGNOSIS / MDM     On exam, patient is resting in his room with his daughter. He appears nontoxic no distress is noted. Heart sounds within normal limits upon auscultation. Lung sounds are clear and equal bilaterally. Bowel sounds are present in all 4 quadrants auscultation. No abdominal distention tenderness or guarding is noted. Upon examination, patient does have what appears to be otitis media of the left ear. TM is intact bilaterally. Left TM is very erythemic and bulging. Tragus and mastoid are nontender bilaterally. There is moderate amount of cerumen noted in the EAC bilaterally. No cervical lymphadenopathy noted. Patient is afebrile nontachycardic. I will send him home with a prescription for amoxicillin. He is to follow-up with a primary care physician.   He states he does not have one that he sees regularly, will provide him with some contact information on his discharge instructions. He is welcome to continue ibuprofen and Tylenol as needed for pain control. Return to the emergency department with any significant increase in pain, significant change in hearing, drainage from the ear that is not wax, severe headache dizziness lightheadedness, or any other new concerning worsening symptoms. Patient states understanding of education and stable for outpatient follow-up at this time. PLAN (LABS / IMAGING / EKG):  No orders of the defined types were placed in this encounter. MEDICATIONS ORDERED:  Orders Placed This Encounter   Medications    DISCONTD: amoxicillin (AMOXIL) 875 MG tablet     Sig: Take 1 tablet by mouth 2 times daily for 7 days     Dispense:  14 tablet     Refill:  0    amoxicillin (AMOXIL) 875 MG tablet     Sig: Take 1 tablet by mouth 2 times daily for 7 days     Dispense:  14 tablet     Refill:  0       Controlled Substances Monitoring:     DIAGNOSTIC RESULTS     EKG: All EKG's are interpreted by the Emergency Department Physician who either signs or Co-signs this chart in the absenceof a cardiologist.      RADIOLOGY: All images are read by the radiologist and their interpretations are reviewed. No orders to display       No results found. LABS:  No results found for this visit on 12/11/22. EMERGENCY DEPARTMENT COURSE           Vitals:    Vitals:    12/11/22 1250 12/11/22 1251   BP:  119/68   Pulse:  99   Resp:  18   Temp:  98.2 °F (36.8 °C)   TempSrc:  Oral   SpO2:  98%   Weight: 63.5 kg (140 lb)    Height: 5' 6\" (1.676 m)      -------------------------  BP: 119/68, Temp: 98.2 °F (36.8 °C), Heart Rate: 99, Resp: 18      RE-EVALUATION:  See ED Course notes above. CONSULTS:  None    PROCEDURES:  None    FINAL IMPRESSION      1.  Left otitis media, unspecified otitis media type          DISPOSITION / PLAN     CONDITION ON DISPOSITION:   Good / Stable for discharge. PATIENT REFERRED TO:  [facility] Emergency Department  [address]   [address] [address]  [phone]  Go to   If symptoms worsen    PCP  419 Same Day  Call   As needed    [place] Ronald, APRN - CNP  [address]  [phone]    Schedule an appointment as soon as possible for a visit       DISCHARGE MEDICATIONS:  Current Discharge Medication List        START taking these medications    Details   amoxicillin (AMOXIL) 875 MG tablet Take 1 tablet by mouth 2 times daily for 7 days  Qty: 14 tablet, Refills: 0             MARISABEL Boyer NP   Emergency Medicine Nurse Practitioner    (Please note that portions of this note were completed with a voice recognition program.  Efforts were made to edit the dictations but occasionally words aremis-transcribed.)      MARISABEL Boyer NP  12/11/22 1954

## 2023-02-04 ENCOUNTER — HOSPITAL ENCOUNTER (EMERGENCY)
Age: 28
Discharge: HOME | End: 2023-02-04
Attending: EMERGENCY MEDICINE
Payer: MEDICAID

## 2023-02-04 VITALS
HEIGHT: 67 IN | OXYGEN SATURATION: 99 % | DIASTOLIC BLOOD PRESSURE: 72 MMHG | SYSTOLIC BLOOD PRESSURE: 117 MMHG | WEIGHT: 145 LBS | HEART RATE: 85 BPM | TEMPERATURE: 99.5 F | RESPIRATION RATE: 18 BRPM | BODY MASS INDEX: 22.76 KG/M2

## 2023-02-04 DIAGNOSIS — K08.89 PAIN, DENTAL: Primary | ICD-10-CM

## 2023-02-04 LAB
SARS-COV-2 RDRP RESP QL NAA+PROBE: NOT DETECTED
SPECIMEN DESCRIPTION: NORMAL

## 2023-02-04 PROCEDURE — 87635 SARS-COV-2 COVID-19 AMP PRB: CPT

## 2023-02-04 PROCEDURE — 6360000002 HC RX W HCPCS: Performed by: EMERGENCY MEDICINE

## 2023-02-04 PROCEDURE — 96372 THER/PROPH/DIAG INJ SC/IM: CPT

## 2023-02-04 PROCEDURE — 99284 EMERGENCY DEPT VISIT MOD MDM: CPT

## 2023-02-04 RX ORDER — PENICILLIN V POTASSIUM 500 MG/1
500 TABLET ORAL 4 TIMES DAILY
Qty: 28 TABLET | Refills: 0 | Status: SHIPPED | OUTPATIENT
Start: 2023-02-04 | End: 2023-02-11

## 2023-02-04 RX ORDER — KETOROLAC TROMETHAMINE 30 MG/ML
30 INJECTION, SOLUTION INTRAMUSCULAR; INTRAVENOUS ONCE
Status: COMPLETED | OUTPATIENT
Start: 2023-02-04 | End: 2023-02-04

## 2023-02-04 RX ADMIN — KETOROLAC TROMETHAMINE 30 MG: 30 INJECTION, SOLUTION INTRAMUSCULAR; INTRAVENOUS at 19:15

## 2023-02-04 ASSESSMENT — PAIN SCALES - GENERAL: PAINLEVEL_OUTOF10: 10

## 2023-02-04 ASSESSMENT — PAIN - FUNCTIONAL ASSESSMENT: PAIN_FUNCTIONAL_ASSESSMENT: 0-10

## 2023-02-04 ASSESSMENT — PAIN DESCRIPTION - LOCATION: LOCATION: MOUTH

## 2023-02-04 NOTE — ED PROVIDER NOTES
[address]  ED  Emergency Department Encounter  Emergency Medicine Resident     Pt Name:Jn Kingston  MRN: [id number]  [place] 1995  Date of evaluation: 2/4/23  PCP:  No primary care provider on file. Note Started: 6:41 PM EST      CHIEF COMPLAINT       Chief Complaint   Patient presents with    Dental Pain       HISTORY OF PRESENT ILLNESS  (Location/Symptom, Timing/Onset, Context/Setting, Quality, Duration, Modifying Factors, Severity.)      Elisabeth Ocasio is a 28 y.o. male who presents with dental pain in his bilateral lower molars. The patient states this has been ongoing for a year. He states nothing changed today, however he has not had insurance previously and now has insurance. He states he came in today because his daughter is admitted upstairs and he figured he might as well just come down to be assessed. He states he has multiple broken teeth in his bilateral lower molars, however has not been seen by dentist.  He denies any fevers, chills, cough, drainage from any tooth or gum. He denies any recent or new breaks in his teeth. He states his pain is a 10 out of 10. PAST MEDICAL / SURGICAL / SOCIAL / FAMILY HISTORY      has no past medical history on file. has a past surgical history that includes Adenoidectomy.       Social History     Socioeconomic History    Marital status: Single     Spouse name: Not on file    Number of children: Not on file    Years of education: Not on file    Highest education level: Not on file   Occupational History    Not on file   Tobacco Use    Smoking status: Former     Packs/day: 0.50     Types: Cigarettes    Smokeless tobacco: Never   Vaping Use    Vaping Use: Every day   Substance and Sexual Activity    Alcohol use: Yes     Comment: occ    Drug use: Yes     Types: Marijuana Delpha Reaper)     Comment: daily    Sexual activity: Not on file   Other Topics Concern    Not on file   Social History Narrative    Not on file     Social ***    REVIEW OF SYSTEMS       Review of Systems    PHYSICAL EXAM      INITIAL VITALS:   There were no vitals taken for this visit. Physical Exam      DDX/DIAGNOSTIC RESULTS / EMERGENCY DEPARTMENT COURSE / MDM     Medical Decision Making  Risk  Prescription drug management. EKG  ***    All EKG's are interpreted by the Emergency Department Physician who either signs or Co-signs this chart in the absence of a cardiologist.    EMERGENCY DEPARTMENT COURSE:  ***         PROCEDURES:  ***    CONSULTS:  None    CRITICAL CARE:  There was significant risk of life threatening deterioration of patient's condition requiring my direct management. Critical care time *** minutes, excluding any documented procedures. FINAL IMPRESSION      No diagnosis found. DISPOSITION / PLAN     DISPOSITION        PATIENT REFERRED TO:  No follow-up provider specified.     DISCHARGE MEDICATIONS:  New Prescriptions    No medications on file       Karol Shaw DO  Emergency Medicine Resident    (Please note that portions of thisnote were completed with a voice recognition program.  Efforts were made to edit the dictations but occasionally words are mis-transcribed.) Eyes:      Conjunctiva/sclera: Conjunctivae normal.      Pupils: Pupils are equal, round, and reactive to light. Pulmonary:      Effort: Pulmonary effort is normal. No respiratory distress. Musculoskeletal:      Cervical back: Normal range of motion. Skin:     General: Skin is warm and dry. Neurological:      Mental Status: He is alert and oriented to person, place, and time. Psychiatric:         Mood and Affect: Mood normal.         Behavior: Behavior normal.         Judgment: Judgment normal.         DDX/DIAGNOSTIC RESULTS / EMERGENCY DEPARTMENT COURSE / MDM     Medical Decision Making  Patient is a 79-year-old male presents with 1 year of bilateral lower dental pain. On exam, he is afebrile, vital signs are stable. He has no tenderness to palpation of any specific tooth. There is no evidence of focal abscess, recently fractured tooth, he has overall poor dentition. We will give the patient a dose of IM Toradol and plan for discharge with prescription for penicillin. Anticipate discharge. Risk  Prescription drug management. EMERGENCY DEPARTMENT COURSE:  We will provide the patient with dental clinic options for follow-up. Discussed with the patient that dentistry is going to be his best option for pain relief. Additionally provided the patient with a follow-up clinic to establish care with a primary care provider. Patient verbalized understanding and all questions were answered. PROCEDURES:  N/A    CONSULTS:  None    CRITICAL CARE:  There was significant risk of life threatening deterioration of patient's condition requiring my direct management. Critical care time 0 minutes, excluding any documented procedures. FINAL IMPRESSION      1.  Pain, dental          DISPOSITION / PLAN     DISPOSITION Decision To Discharge 02/04/2023 07:30:46 PM      PATIENT REFERRED TO:  [address] [place] [id number] [phone]    For post-ER visit assessment    DISCHARGE MEDICATIONS:  Discharge Medication List as of 2/4/2023  7:40 PM        START taking these medications    Details   penicillin v potassium (VEETID) 500 MG tablet Take 1 tablet by mouth 4 times daily for 7 days, Disp-28 tablet, R-0Print             Gordo Burdick DO  Emergency Medicine Resident    (Please note that portions of thisnote were completed with a voice recognition program.  Efforts were made to edit the dictations but occasionally words are mis-transcribed.)       Gordo Burdick DO  Resident  02/07/23 8740

## 2023-02-05 ENCOUNTER — APPOINTMENT (OUTPATIENT)
Dept: CT IMAGING | Age: 28
End: 2023-02-05
Payer: MEDICAID

## 2023-02-05 ENCOUNTER — HOSPITAL ENCOUNTER (EMERGENCY)
Age: 28
Discharge: HOME | End: 2023-02-05
Attending: EMERGENCY MEDICINE
Payer: MEDICAID

## 2023-02-05 VITALS
RESPIRATION RATE: 18 BRPM | HEART RATE: 91 BPM | HEIGHT: 67 IN | SYSTOLIC BLOOD PRESSURE: 121 MMHG | DIASTOLIC BLOOD PRESSURE: 72 MMHG | BODY MASS INDEX: 21.97 KG/M2 | WEIGHT: 140 LBS | OXYGEN SATURATION: 96 % | TEMPERATURE: 98.2 F

## 2023-02-05 DIAGNOSIS — B34.9 VIRAL SYNDROME: Primary | ICD-10-CM

## 2023-02-05 LAB
ABSOLUTE EOS #: 0 K/UL (ref 0–0.4)
ABSOLUTE LYMPH #: 1.3 K/UL (ref 1–4.8)
ABSOLUTE MONO #: 0.5 K/UL (ref 0.1–1.2)
ANION GAP SERPL CALCULATED.3IONS-SCNC: 13 MMOL/L (ref 9–17)
BASOPHILS # BLD: 0 % (ref 0–2)
BASOPHILS ABSOLUTE: 0 K/UL (ref 0–0.2)
BUN SERPL-MCNC: 14 MG/DL (ref 6–20)
CALCIUM SERPL-MCNC: 9.3 MG/DL (ref 8.6–10.4)
CHLORIDE SERPL-SCNC: 102 MMOL/L (ref 98–107)
CO2 SERPL-SCNC: 23 MMOL/L (ref 20–31)
CREAT SERPL-MCNC: 0.65 MG/DL (ref 0.7–1.2)
EOSINOPHILS RELATIVE PERCENT: 0 % (ref 1–4)
FLUAV AG SPEC QL: NEGATIVE
FLUBV AG SPEC QL: NEGATIVE
GFR SERPL CREATININE-BSD FRML MDRD: >60 ML/MIN/1.73M2
GLUCOSE SERPL-MCNC: 108 MG/DL (ref 70–99)
HCT VFR BLD AUTO: 41.7 % (ref 41–53)
HGB BLD-MCNC: 14 G/DL (ref 13.5–17.5)
LYMPHOCYTES # BLD: 25 % (ref 24–44)
MCH RBC QN AUTO: 30.6 PG (ref 26–34)
MCHC RBC AUTO-ENTMCNC: 33.6 G/DL (ref 31–37)
MCV RBC AUTO: 91.2 FL (ref 80–100)
MONOCYTES # BLD: 10 % (ref 2–11)
PDW BLD-RTO: 12.9 % (ref 12.5–15.4)
PLATELET # BLD AUTO: 165 K/UL (ref 140–450)
PMV BLD AUTO: 7.6 FL (ref 6–12)
POTASSIUM SERPL-SCNC: 4.1 MMOL/L (ref 3.7–5.3)
RBC # BLD: 4.57 M/UL (ref 4.5–5.9)
SEG NEUTROPHILS: 65 % (ref 36–66)
SEGMENTED NEUTROPHILS ABSOLUTE COUNT: 3.2 K/UL (ref 1.8–7.7)
SODIUM SERPL-SCNC: 138 MMOL/L (ref 135–144)
WBC # BLD AUTO: 5 K/UL (ref 3.5–11)

## 2023-02-05 PROCEDURE — 96375 TX/PRO/DX INJ NEW DRUG ADDON: CPT

## 2023-02-05 PROCEDURE — 99284 EMERGENCY DEPT VISIT MOD MDM: CPT

## 2023-02-05 PROCEDURE — 87804 INFLUENZA ASSAY W/OPTIC: CPT

## 2023-02-05 PROCEDURE — 80048 BASIC METABOLIC PNL TOTAL CA: CPT

## 2023-02-05 PROCEDURE — 85025 COMPLETE CBC W/AUTO DIFF WBC: CPT

## 2023-02-05 PROCEDURE — 36415 COLL VENOUS BLD VENIPUNCTURE: CPT

## 2023-02-05 PROCEDURE — 70450 CT HEAD/BRAIN W/O DYE: CPT

## 2023-02-05 PROCEDURE — 2580000003 HC RX 258: Performed by: EMERGENCY MEDICINE

## 2023-02-05 PROCEDURE — 6360000002 HC RX W HCPCS: Performed by: EMERGENCY MEDICINE

## 2023-02-05 PROCEDURE — 96374 THER/PROPH/DIAG INJ IV PUSH: CPT

## 2023-02-05 RX ORDER — ONDANSETRON 2 MG/ML
4 INJECTION INTRAMUSCULAR; INTRAVENOUS ONCE
Status: COMPLETED | OUTPATIENT
Start: 2023-02-05 | End: 2023-02-05

## 2023-02-05 RX ORDER — MORPHINE SULFATE 4 MG/ML
4 INJECTION, SOLUTION INTRAMUSCULAR; INTRAVENOUS ONCE
Status: DISCONTINUED | OUTPATIENT
Start: 2023-02-05 | End: 2023-02-05

## 2023-02-05 RX ORDER — 0.9 % SODIUM CHLORIDE 0.9 %
1000 INTRAVENOUS SOLUTION INTRAVENOUS ONCE
Status: COMPLETED | OUTPATIENT
Start: 2023-02-05 | End: 2023-02-05

## 2023-02-05 RX ORDER — DEXAMETHASONE SODIUM PHOSPHATE 10 MG/ML
10 INJECTION, SOLUTION INTRAMUSCULAR; INTRAVENOUS ONCE
Status: COMPLETED | OUTPATIENT
Start: 2023-02-05 | End: 2023-02-05

## 2023-02-05 RX ADMIN — ONDANSETRON 4 MG: 2 INJECTION INTRAMUSCULAR; INTRAVENOUS at 07:37

## 2023-02-05 RX ADMIN — SODIUM CHLORIDE 1000 ML: 9 INJECTION, SOLUTION INTRAVENOUS at 07:36

## 2023-02-05 RX ADMIN — DEXAMETHASONE SODIUM PHOSPHATE 10 MG: 10 INJECTION, SOLUTION INTRAMUSCULAR; INTRAVENOUS at 07:38

## 2023-02-05 ASSESSMENT — PAIN - FUNCTIONAL ASSESSMENT: PAIN_FUNCTIONAL_ASSESSMENT: 0-10

## 2023-02-05 ASSESSMENT — PAIN DESCRIPTION - PAIN TYPE: TYPE: ACUTE PAIN

## 2023-02-05 ASSESSMENT — PAIN SCALES - GENERAL: PAINLEVEL_OUTOF10: 10

## 2023-02-05 NOTE — ED PROVIDER NOTES
[facility] Emergency Department  [address] [address] [facility] [address] [facility] [address]  Phone: [phone]  Fax: Tish Kate 1980      Pt Name: Rey Stringer  MRN: [id number]  [place] 1995  Date of evaluation: 2/5/2023    CHIEF COMPLAINT       Chief Complaint   Patient presents with    Generalized Body Aches       HISTORY OF PRESENT ILLNESS    Rey Stringer is a 28 y.o. male who presents to the emergency room complaining of body aches and headache. Symptoms started on Wednesday. Denies fevers but admits to chills and diaphoresis. His daughter is currently in the hospital with COVID-19 and another \"human type virus \". He complains of a frontal headache with photophobia and nausea no vomiting. Denies neck pain. Was seen last night at [facility] for dental pain and headache and was given Toradol and check for COVID and he was negative. He says he lost his sense of taste. He denies any other complaints. Pain 10 out of 10 throbbing. He does have a history of headaches/migraines. REVIEW OF SYSTEMS       Constitutional: Positive chills no fevers positive body aches  HEENT: No sore throat, rhinorrhea, or earache   Eyes: No blurry vision or double vision no drainage positive photophobia  Cardiovascular: No chest pain or tachycardia   Respiratory: No wheezing or shortness of breath no cough   Gastrointestinal: Positive nausea, no vomiting, diarrhea, constipation, or abdominal pain   : No hematuria or dysuria   Musculoskeletal: No swelling or pain   Skin: No rash   Neurological: No focal neurologic complaints, paresthesias, weakness, positive headache     PAST MEDICAL HISTORY    has no past medical history on file. SURGICAL HISTORY      has a past surgical history that includes Adenoidectomy.     CURRENT MEDICATIONS       Previous Medications    PENICILLIN V POTASSIUM (VEETID) 500 MG TABLET    Take 1 tablet by mouth 4 times daily for 7 days       ALLERGIES   has No Known Allergies.    FAMILY HISTORY     has no family status information on file.      family history is not on file.    SOCIAL HISTORY      reports that he has quit smoking. His smoking use included cigarettes. He smoked an average of .5 packs per day. He has never used smokeless tobacco. He reports current alcohol use. He reports current drug use. Drug: Marijuana (Weed).    PHYSICAL EXAM       ED Triage Vitals [02/05/23 0722]   BP Temp Temp Source Heart Rate Resp SpO2 Height Weight   121/72 98.2 °F (36.8 °C) Oral 91 18 96 % 5' 7\" (1.702 m) 140 lb (63.5 kg)       Constitutional: Alert, oriented x3, nontoxic, answering questions appropriately, acting properly for age, in no acute distress   HEENT: Extraocular muscles intact, mucus membranes moist, TMs clear bilaterally, no posterior pharyngeal erythema or exudates, Pupils equal, round, reactive to light, mild photophobia  Neck: Trachea midline no meningismus complains of mid back pain when looking down but unable to extend and look in both directions without any pain  Cardiovascular: Regular rhythm and rate no murmurs   Respiratory: Clear to auscultation bilaterally no wheezes, rhonchi, rales, no respiratory distress no tachypnea no retractions no hypoxia  Gastrointestinal: Soft, nontender, nondistended, positive bowel sounds.  No rebound, rigidity, or guarding.   Musculoskeletal: No extremity pain or swelling   Neurologic: Moving all 4 extremities without difficulty there are no gross focal neurologic deficits patellar tendons +2/4 bilaterally  Skin: Warm and dry     DIFFERENTIAL DIAGNOSIS/ MDM:     IV fluids and labs.  CT head.  Does not have a ride unable to give stronger pain medicine just take ibuprofen and Tylenol as well as receive Toradol last night.  Will give Decadron and Zofran.    Headache: Tension headache, migraine headache, cluster headache, sinusitis. Intracranial pathologies such as brain tumor, SAH, encephalitis, meningitis.      DIAGNOSTIC  RESULTS     EKG: All EKG's are interpreted by the Emergency Department Physician who either signs or Co-signs this chart in the absence of a cardiologist.        Not indicated unless otherwise documented above    LABS:  Results for orders placed or performed during the hospital encounter of 02/05/23   Rapid influenza A/B antigens    Specimen: Nares   Result Value Ref Range    Flu A Antigen NEGATIVE NEGATIVE    Flu B Antigen NEGATIVE NEGATIVE   CBC with Auto Differential   Result Value Ref Range    WBC 5.0 3.5 - 11.0 k/uL    RBC 4.57 4.5 - 5.9 m/uL    Hemoglobin 14.0 13.5 - 17.5 g/dL    Hematocrit 41.7 41 - 53 %    MCV 91.2 80 - 100 fL    MCH 30.6 26 - 34 pg    MCHC 33.6 31 - 37 g/dL    RDW 12.9 12.5 - 15.4 %    Platelets 229 652 - 538 k/uL    MPV 7.6 6.0 - 12.0 fL    Seg Neutrophils 65 36 - 66 %    Lymphocytes 25 24 - 44 %    Monocytes 10 2 - 11 %    Eosinophils % 0 (L) 1 - 4 %    Basophils 0 0 - 2 %    Segs Absolute 3.20 1.8 - 7.7 k/uL    Absolute Lymph # 1.30 1.0 - 4.8 k/uL    Absolute Mono # 0.50 0.1 - 1.2 k/uL    Absolute Eos # 0.00 0.0 - 0.4 k/uL    Basophils Absolute 0.00 0.0 - 0.2 k/uL   Basic Metabolic Panel   Result Value Ref Range    Glucose 108 (H) 70 - 99 mg/dL    BUN 14 6 - 20 mg/dL    Creatinine 0.65 (L) 0.70 - 1.20 mg/dL    Est, Glom Filt Rate >60 >60 mL/min/1.73m2    Calcium 9.3 8.6 - 10.4 mg/dL    Sodium 138 135 - 144 mmol/L    Potassium 4.1 3.7 - 5.3 mmol/L    Chloride 102 98 - 107 mmol/L    CO2 23 20 - 31 mmol/L    Anion Gap 13 9 - 17 mmol/L       Not indicated unless otherwise documented above    RADIOLOGY:   I reviewed the radiologist interpretations:    CT HEAD WO CONTRAST   Final Result   No acute intracranial abnormality.              Not indicated unless otherwise documented above    EMERGENCY DEPARTMENT COURSE:     The patient was given the following medications:  Orders Placed This Encounter   Medications    0.9 % sodium chloride bolus    ondansetron (ZOFRAN) injection 4 mg    dexamethasone (PF) (DECADRON) injection 10 mg    DISCONTD: morphine injection 4 mg        Vitals:   -------------------------  /72   Pulse 91   Temp 98.2 °F (36.8 °C) (Oral)   Resp 18   Ht 5' 7\" (1.702 m)   Wt 63.5 kg (140 lb)   SpO2 96%   BMI 21.93 kg/m²     8:30 AM flu negative. CBC electrolytes kidney function unremarkable. CT of the head negative for intracranial abnormality. On reevaluation he is feeling much better and was sleeping easily awakened. No meningismal signs no photophobia. Suspect viral etiology versus migraine. We talked about the possibility of meningitis low likelihood for bacterial we talked about viral being a possibility and return immediately for any worsening symptoms including fevers light sensitivity worsening headache neck pain or any other concerns. Recommend following up with family physician for reevaluation Motrin or Tylenol for pain increasing fluids as tolerated. The patient understands that at this time there is no evidence for a more malignant underlying process, but also understands that early in the process of an illness or injury, an emergency department workup can be falsely reassuring. Routine discharge counseling was given, and it is understood that worsening, changing or persistent symptoms should prompt an immediate call or follow up with their primary physician or return to the emergency department. The importance of appropriate follow up was also discussed. I have reviewed the disposition diagnosis. I have answered the questions and given discharge instructions. There was voiced understanding of these instructions and no further questions or complaints. CRITICAL CARE:    None    PROCEDURES:    None      OARRS Report if indicated    Periodic Controlled Substance Monitoring: No signs of potential drug abuse or diversion identified. (Mckinley Flood, DO)        FINAL IMPRESSION      1.  Viral syndrome          DISPOSITION/PLAN   DISPOSITION Decision To Discharge 02/05/2023 08:32:37 AM        CONDITION ON DISPOSITION: STABLE       PATIENT REFERRED TO:  No follow-up provider specified.     DISCHARGE MEDICATIONS:  New Prescriptions    No medications on file       (Please note that portions of this note were completed with a voice recognition program.  Efforts were made to edit the dictations but occasionally words are mis-transcribed.)    Michele Mccallum DO   Attending Emergency Physician     Michele Mccallum DO  02/05/23 7292

## 2023-02-05 NOTE — ED TRIAGE NOTES
Pt arrived to ED 05 via triage. Pt co dental pain. Pt states that this has been going on x 1 year. Pt states that it started in his lower teeth and has now moved to the top. Pt states that he has missing and broken teeth. Pt states that he is swallowing drainage but denies any blood. Pt states that it has become difficult to eat due to the pain but he is still able to drink. Pt denies any CP or SOB. Pt is resting in chair with call light within reach. Breathing is non labored and no acute distress is noted.    Will continue to follow plan of care

## 2023-02-05 NOTE — DISCHARGE INSTRUCTIONS
You are seen in the emergency department today for your dental pain. We provided you with a shot of a pain medication. Additionally, we will send you home with a prescription for an antibiotic to take 4 times a day for 7 days. Please take this until it is gone. We can swab you for COVID, you can follow-up on MyChart for this result. Please return to the emergency department if you develop any swelling in the mouth, difficulty breathing, difficulty swallowing, fevers that do not come down with Tylenol or Motrin. Please continue to take Tylenol and Motrin as needed at home for your pain. Please follow-up with a dentist from the clinic list provided. PLEASE RETURN TO THE EMERGENCY DEPARTMENT IMMEDIATELY if you develop any concerning symptoms such as: chest pain, shortness of breath, feeling like your heart is racing, high fever not relieved by acetaminophen (Tylenol) and/or ibuprofen (Motrin / Advil), chills, persistent nausea and/or vomiting, loss of consciousness, numbness, weakness or tingling in the arms or legs or change in color of the extremities, changes in mental status, persistent or severe headache, blurry vision, loss of bladder / bowel control, unable to follow up with your physician, or other any other care or concern.
no discharge/no fever

## 2023-02-05 NOTE — ED PROVIDER NOTES
[facility] ED  eMERGENCY dEPARTMENT eNCOUnter   Attending Attestation     Pt Name: Dre Raymond  MRN: [id number]  [place] 1995  Date of evaluation: 2/4/23       Dre Raymond is a 28 y.o. male who presents with Dental Pain      History: This with dental pain. Patient has had dental pain for a year. Patient's been unable to see dentist.  Patient says symptoms are continuous. Patient has had improvement with antibiotics and pain control in the past.    Exam: Patient has multiple carious teeth some broken off at the gumline in the bilateral mandible over the molars. Multiple carious teeth noted along with this. Plan for antibiotics, dental clinic follow-up, patient is here because his daughter is in the hospital and he requested a COVID test as well. Patient will follow up with [facility]. We will treat with pain control antibiotic and dental clinic follow-up. I performed a history and physical examination of the patient and discussed management with the resident. I reviewed the residents note and agree with the documented findings and plan of care. Any areas of disagreement are noted on the chart. I was personally present for the key portions of any procedures. I have documented in the chart those procedures where I was not present during the key portions. I have personally reviewed all images and agree with the resident's interpretation. I have reviewed the emergency nurses triage note. I agree with the chief complaint, past medical history, past surgical history, allergies, medications, social and family history as documented unless otherwise noted below. Documentation of the HPI, Physical Exam and Medical Decision Making performed by medical students or scribes is based on my personal performance of the HPI, PE and MDM.  For Phys Assistant/ Nurse Practitioner cases/documentation I have had a face to face evaluation of this patient and have completed at least one if not all key elements of the E/M (history, physical exam, and MDM). Additional findings are as noted. For APC cases I have personally evaluated and examined the patient in conjunction with the APC and agree with the treatment plan and disposition of the patient as recorded by the APC.     Kimberly Smith MD  Attending Emergency  Physician       Debera Severance, MD  02/04/23 9411

## 2023-02-05 NOTE — DISCHARGE INSTRUCTIONS
Increase fluids as tolerated continue Motrin or Tylenol for fever and pain  Follow-up with family physician for reevaluation  Return immediately if any worsening symptoms or any other concerns    Tell us how we did visit: [email or web address]   and let us know about your experience

## 2023-02-06 ENCOUNTER — HOSPITAL ENCOUNTER (EMERGENCY)
Age: 28
Discharge: HOME | End: 2023-02-06
Attending: EMERGENCY MEDICINE
Payer: MEDICAID

## 2023-02-06 VITALS
RESPIRATION RATE: 15 BRPM | OXYGEN SATURATION: 98 % | SYSTOLIC BLOOD PRESSURE: 143 MMHG | BODY MASS INDEX: 21.97 KG/M2 | HEART RATE: 97 BPM | HEIGHT: 67 IN | TEMPERATURE: 98.2 F | WEIGHT: 140 LBS | DIASTOLIC BLOOD PRESSURE: 79 MMHG

## 2023-02-06 DIAGNOSIS — B34.9 VIRAL ILLNESS: Primary | ICD-10-CM

## 2023-02-06 LAB
FLUAV AG SPEC QL: NEGATIVE
FLUBV AG SPEC QL: NEGATIVE
SARS-COV-2 RDRP RESP QL NAA+PROBE: NOT DETECTED
SPECIMEN DESCRIPTION: NORMAL

## 2023-02-06 PROCEDURE — 87804 INFLUENZA ASSAY W/OPTIC: CPT

## 2023-02-06 PROCEDURE — 99284 EMERGENCY DEPT VISIT MOD MDM: CPT

## 2023-02-06 PROCEDURE — 87635 SARS-COV-2 COVID-19 AMP PRB: CPT

## 2023-02-06 PROCEDURE — 96375 TX/PRO/DX INJ NEW DRUG ADDON: CPT

## 2023-02-06 PROCEDURE — 96374 THER/PROPH/DIAG INJ IV PUSH: CPT

## 2023-02-06 PROCEDURE — 6360000002 HC RX W HCPCS: Performed by: PHYSICIAN ASSISTANT

## 2023-02-06 RX ORDER — ONDANSETRON 4 MG/1
4 TABLET, ORALLY DISINTEGRATING ORAL EVERY 8 HOURS PRN
Qty: 20 TABLET | Refills: 0 | Status: SHIPPED | OUTPATIENT
Start: 2023-02-06

## 2023-02-06 RX ORDER — ONDANSETRON 2 MG/ML
4 INJECTION INTRAMUSCULAR; INTRAVENOUS ONCE
Status: DISCONTINUED | OUTPATIENT
Start: 2023-02-06 | End: 2023-02-06

## 2023-02-06 RX ORDER — KETOROLAC TROMETHAMINE 30 MG/ML
30 INJECTION, SOLUTION INTRAMUSCULAR; INTRAVENOUS ONCE
Status: DISCONTINUED | OUTPATIENT
Start: 2023-02-06 | End: 2023-02-06

## 2023-02-06 RX ORDER — KETOROLAC TROMETHAMINE 30 MG/ML
30 INJECTION, SOLUTION INTRAMUSCULAR; INTRAVENOUS ONCE
Status: DISCONTINUED | OUTPATIENT
Start: 2023-02-06 | End: 2023-02-06 | Stop reason: HOSPADM

## 2023-02-06 RX ORDER — ONDANSETRON 4 MG/1
4 TABLET, ORALLY DISINTEGRATING ORAL ONCE
Status: DISCONTINUED | OUTPATIENT
Start: 2023-02-06 | End: 2023-02-06 | Stop reason: HOSPADM

## 2023-02-06 RX ADMIN — KETOROLAC TROMETHAMINE 30 MG: 30 INJECTION, SOLUTION INTRAMUSCULAR; INTRAVENOUS at 12:57

## 2023-02-06 RX ADMIN — ONDANSETRON 4 MG: 2 INJECTION INTRAMUSCULAR; INTRAVENOUS at 12:56

## 2023-02-06 ASSESSMENT — PAIN DESCRIPTION - LOCATION: LOCATION: HEAD;NECK

## 2023-02-06 ASSESSMENT — PAIN DESCRIPTION - PAIN TYPE: TYPE: ACUTE PAIN

## 2023-02-06 ASSESSMENT — PAIN SCALES - GENERAL
PAINLEVEL_OUTOF10: 10
PAINLEVEL_OUTOF10: 10

## 2023-02-06 ASSESSMENT — PAIN - FUNCTIONAL ASSESSMENT: PAIN_FUNCTIONAL_ASSESSMENT: 0-10

## 2023-02-06 NOTE — ED PROVIDER NOTES
[facility] Emergency Department  [address] [address] [facility] [address] [facility] [address]  Phone: [phone]  Fax: [phone]        Pt Name: Velasquez Carnes  MRN: [id number]  [place] 1995  Date of evaluation: 2/6/23    CHIEFCOMPLAINT       Chief Complaint   Patient presents with    Headache    Generalized Body Aches       HISTORY OF PRESENT ILLNESS (Location/Symptom, Timing/Onset, Context/Setting, Quality, Duration, Modifying Factors, Severity)      Velasquez Carnes is a 28 y.o. male with no pertinent PMH who presents to the ED via private auto with ***     PAST MEDICAL / SURGICAL / SOCIAL / FAMILY HISTORY     PMH:  has no past medical history on file. Surgical History:  has a past surgical history that includes Adenoidectomy. Social History:  reports that he has quit smoking. His smoking use included cigarettes. He smoked an average of .5 packs per day. He has never used smokeless tobacco. He reports current alcohol use. He reports current drug use. Drug: Marijuana Clydia Formica). Family History: has no family status information on file. family history is not on file. Psychiatric History: None    Allergies: Patient has no known allergies. Home Medications:   Prior to Admission medications    Medication Sig Start Date End Date Taking? Authorizing Provider   ondansetron (ZOFRAN-ODT) 4 MG disintegrating tablet Take 1 tablet by mouth every 8 hours as needed for Nausea or Vomiting 2/6/23  Yes Radha Ramirez PA-C   penicillin v potassium (VEETID) 500 MG tablet Take 1 tablet by mouth 4 times daily for 7 days 2/4/23 2/11/23  Miguel Hernandez,        REVIEW OF SYSTEMS  (2-9 systems for level 4, 10 ormore for level 5)      Review of Systems  See HPI. PHYSICAL EXAM  (up to 7 for level 4, 8 or more for level 5)      INITIAL VITALS:  height is 5' 7\" (1.702 m) and weight is 63.5 kg (140 lb). His oral temperature is 98.2 °F (36.8 °C). His blood pressure is 143/79 (abnormal) and his pulse is 97. His respiration is 15 and oxygen saturation is 98%. Vital signs reviewed. Physical Exam    General:  Alert, cooperative, well-groomed, well-nourished, appears stated age, and is in no acute distress. Head:  Normocephalic, atraumatic, and without obvious abnormality. Eyes:  Sclerae/conjunctivae clear without injection, pallor, or icterus. Corneas clear without opacities. EOM's intact. ENT: Ears and nose are all without obvious masses lesion or deformity. Neck: Supple and symmetrical. Trachea midline. No jugular venous distention. Lungs:   No respiratory distress. Clear to auscultation bilaterally. No wheezes, rhonchi, or rales. Heart:  Regular rate. Regular rhythm. No murmurs, rubs, or gallops. Abdomen:   Normoactive bowel sounds. Soft, nontender, nondistended without guarding or rebound. No palpable masses. Extremities: Warm and dry without erythema or edema. Skin: Soft, good turgor, and well-hydrated. Neurologic: GCS is 15 and no focal deficits are appreciated. Normal gait. Grossly normal motor and sensation. Speech clear. Psychiatric: Normal mood and affect. Normal behavior. Coherent thought process.      DIFFERENTIAL DIAGNOSIS / MDM     ***    PLAN (LABS / IMAGING / EKG):  Orders Placed This Encounter   Procedures    COVID-19, Rapid    Rapid Influenza A/B Antigens       MEDICATIONS ORDERED:  Orders Placed This Encounter   Medications    DISCONTD: ondansetron (ZOFRAN) injection 4 mg    DISCONTD: ketorolac (TORADOL) injection 30 mg    ondansetron (ZOFRAN-ODT) disintegrating tablet 4 mg    ketorolac (TORADOL) injection 30 mg    ondansetron (ZOFRAN-ODT) 4 MG disintegrating tablet     Sig: Take 1 tablet by mouth every 8 hours as needed for Nausea or Vomiting     Dispense:  20 tablet     Refill:  0       Controlled Substances Monitoring:     DIAGNOSTIC RESULTS     EKG: All EKG's are interpreted by the Emergency Department Physician who either signs or Co-signs this chart in the absenceof a cardiologist.    ***    RADIOLOGY: All images are read by the radiologist and their interpretations are reviewed. CT HEAD WO CONTRAST    Result Date: 2/5/2023  EXAMINATION: CT OF THE HEAD WITHOUT CONTRAST  2/5/2023 7:55 am TECHNIQUE: CT of the head was performed without the administration of intravenous contrast. Automated exposure control, iterative reconstruction, and/or weight based adjustment of the mA/kV was utilized to reduce the radiation dose to as low as reasonably achievable. COMPARISON: None. HISTORY: ORDERING SYSTEM PROVIDED HISTORY: Headache TECHNOLOGIST PROVIDED HISTORY: Headache Decision Support Exception - unselect if not a suspected or confirmed emergency medical condition->Emergency Medical Condition (MA) Reason for Exam: headache FINDINGS: BRAIN/VENTRICLES: There is no acute intracranial hemorrhage, mass effect or midline shift. No abnormal extra-axial fluid collection. The gray-white differentiation is maintained without evidence of an acute infarct. There is no evidence of hydrocephalus. ORBITS: The visualized portion of the orbits demonstrate no acute abnormality. SINUSES: The visualized paranasal sinuses and mastoid air cells demonstrate no acute abnormality. SOFT TISSUES/SKULL:  No acute abnormality of the visualized skull or soft tissues. No acute intracranial abnormality. LABS:  Results for orders placed or performed during the hospital encounter of 02/06/23   COVID-19, Rapid    Specimen: Nasopharyngeal Swab   Result Value Ref Range    Specimen Description . NASOPHARYNGEAL SWAB     SARS-CoV-2, Rapid Not Detected Not Detected   Rapid Influenza A/B Antigens    Specimen: Nasopharyngeal   Result Value Ref Range    Flu A Antigen NEGATIVE NEGATIVE    Flu B Antigen NEGATIVE NEGATIVE       EMERGENCY DEPARTMENT COURSE           Vitals:    Vitals:    02/06/23 1226 02/06/23 1228 02/06/23 1229   BP: (!) 143/79  (!) 143/79   Pulse: 95  97   Resp: 16  15   Temp:  98.2 °F (36.8 °C)    TempSrc:  Oral    SpO2: 98%  98%   Weight: 63.5 kg (140 lb)     Height: 5' 7\" (1.702 m)       -------------------------  BP: (!) 143/79, Temp: 98.2 °F (36.8 °C), Heart Rate: 97, Resp: 15      RE-EVALUATION:  See ED Course notes above. The patient and/or family and I have discussed the diagnosis and risks, and we agree with discharging home to follow-up with their pertinent providers. The patient appears stable for discharge and has been instructed to return immediately for new concerning symptoms or if the symptoms worsen in any way. The patient understands that at this time there is no evidence for a more malignant underlying process, but the patient also understands that early in the process of an illness or injury, an emergency department workup can be falsely reassuring. Routine discharge counseling was given, and the patient understands that worsening, changing or persistent symptoms should prompt an immediate call or follow up with their primary physician or return to the emergency department. I have reviewed the disposition diagnosis with the patient and or their family/guardian. I have answered their questions and given discharge instructions. They voiced understanding of these instructions and did not have any further questions or complaints. This patient was seen by the attending physician and they agreed with the assessment and plan. CONSULTS:  None    PROCEDURES:  None    FINAL IMPRESSION      1. Viral illness          DISPOSITION / PLAN     CONDITION ON DISPOSITION:   Good / Stable for discharge. ***    PATIENT REFERRED TO:  No follow-up provider specified.     DISCHARGE MEDICATIONS:  New Prescriptions    ONDANSETRON (ZOFRAN-ODT) 4 MG DISINTEGRATING TABLET    Take 1 tablet by mouth every 8 hours as needed for Nausea or Vomiting       Arman Lombard, PA-C   Emergency Medicine Physician Assistant    (Please note that portions of this note were completed with a voice recognition program.  Efforts were made to edit the dictations but occasionally words aremis-transcribed.)

## 2023-02-06 NOTE — DISCHARGE INSTRUCTIONS
PLEASE RETURN TO THE EMERGENCY DEPARTMENT IMMEDIATELY if your symptoms worsen in anyway or in 1-2 days if not improved for re-evaluation. You should immediately return to the ER for symptoms such as new or worsening pain, difficulty breathing or swallowing, a change in your voice, a feeling of passing out, light headed, dizziness, chest pain, headache, abdominal pain or vomiting. You should take ibuprofen for your pain around-the-clock. Tylenol if needed. Take an over-the-counter antihistamine like Zyrtec or Claritin or Benadryl at night to help with the congestion. Recommend taking a cough suppressant at night like Delsym and a cough expectorant like Mucinex during the day. Be sure to drink plenty of fluids and avoid dehydration. You can use Cepacol spray or cough drops help with your sore throat. Zofran for nausea. Please understand that at this time there is no evidence for a more serious underlying process, but that early in the process of an illness or injury, an emergency department workup can be falsely reassuring. You should contact your family doctor within the next 48 hours for a follow up appointment. You may take Tylenol and/or Motrin as needed for Pain. You may also gargle with salt water as needed. Sofia Lehman!!!    From [facility] ([facility]) and [address] [place] [address] Emergency Services    On behalf of the Emergency Department staff at [facility]), I would like to thank you for giving us the opportunity to address your health care needs and concerns. We hope that during your visit, our service was delivered in a professional and caring manner. Please keep [facility]) in mind as we walk with you down the path to your own personal wellness. Please expect an automated text message or email from us so we can ask a few questions about your health and progress. Based on your answers, a clinician may call you back to offer help and instructions.     Please understand that early in the process of an illness or injury, an emergency department workup can be falsely reassuring. If you notice any worsening, changing or persistent symptoms please call your family doctor or return to the ER immediately. Tell us how we did during your visit at [email or web address]   and let us know about your experience

## 2023-02-06 NOTE — LETTER
[address] [facility] Emergency Department  [address] [address] [address]  Phone: [phone]  Fax: [phone]               February 6, 2023    Patient: Shima Roberts   YOB: 1995   Date of Visit: 2/6/2023       To Whom It May Concern:    Jaymie Tucker was seen and treated in our emergency department on 2/5/23 & 2/6/23. He may return to work on 2/7/23 without restrictions.    Sincerely,       Marcelo Salinas RN         Signature:__________________________________

## 2023-02-06 NOTE — ED PROVIDER NOTES
[address] Emergency Department  [address] [address] [place] [address] [address] [place] [address]  Phone: [phone]  Fax: [phone]      Attending Physician Attestation    I performed a history and physical examination of the patient and discussed management with the mid level provider. I reviewed the mid level provider's note and agree with the documented findings and plan of care. Any areas of disagreement are noted on the chart. I was personally present for the key portions of any procedures. I have documented in the chart those procedures where I was not present during the key portions. I have reviewed the emergency nurses triage note. I agree with the chief complaint, past medical history, past surgical history, allergies, medications, social and family history as documented unless otherwise noted below. Documentation of the HPI, Physical Exam and Medical Decision Making performed by mid level providers is based on my personal performance of the HPI, PE and MDM. For Physician Assistant/ Nurse Practitioner cases/documentation I have personally evaluated this patient and have completed at least one if not all key elements of the E/M (history, physical exam, and MDM). Additional findings are as noted. CHIEF COMPLAINT       Chief Complaint   Patient presents with    Headache    Generalized Body Aches         HISTORY OF PRESENT ILLNESS    Yaa Jiménez is a 28 y.o. male who presents to the emergency department several day history of flulike symptoms with a positive COVID exposure. PAST MEDICAL HISTORY    has no past medical history on file. SURGICAL HISTORY      has a past surgical history that includes Adenoidectomy.     CURRENT MEDICATIONS       Discharge Medication List as of 2/6/2023  1:49 PM        CONTINUE these medications which have NOT CHANGED    Details   penicillin v potassium (VEETID) 500 MG tablet Take 1 tablet by mouth 4 times daily for 7 days, Disp-28 tablet, R-0Print ALLERGIES     has No Known Allergies. FAMILY HISTORY     has no family status information on file. family history is not on file. SOCIAL HISTORY      reports that he has quit smoking. His smoking use included cigarettes. He smoked an average of .5 packs per day. He has never used smokeless tobacco. He reports current alcohol use. He reports current drug use. Drug: Marijuana Garcia Gourd). PHYSICAL EXAM     INITIAL VITALS:  height is 5' 7\" (1.702 m) and weight is 63.5 kg (140 lb). His oral temperature is 98.2 °F (36.8 °C). His blood pressure is 143/79 (abnormal) and his pulse is 97. His respiration is 15 and oxygen saturation is 98%. Unremarkable      DIAGNOSTIC RESULTS     RADIOLOGY:   Non-plain film images such as CT, Ultrasound and MRI are read by the radiologist. Plain radiographic images are visualized and the radiologist interpretations are reviewed as follows: No orders to display       LABS:  Results for orders placed or performed during the hospital encounter of 02/06/23   COVID-19, Rapid    Specimen: Nasopharyngeal Swab   Result Value Ref Range    Specimen Description . NASOPHARYNGEAL SWAB     SARS-CoV-2, Rapid Not Detected Not Detected   Rapid Influenza A/B Antigens    Specimen: Nasopharyngeal   Result Value Ref Range    Flu A Antigen NEGATIVE NEGATIVE    Flu B Antigen NEGATIVE NEGATIVE           EMERGENCY DEPARTMENT COURSE:   Vitals:    Vitals:    02/06/23 1226 02/06/23 1228 02/06/23 1229   BP: (!) 143/79  (!) 143/79   Pulse: 95  97   Resp: 16  15   Temp:  98.2 °F (36.8 °C)    TempSrc:  Oral    SpO2: 98%  98%   Weight: 63.5 kg (140 lb)     Height: 5' 7\" (1.702 m)       -------------------------  BP: (!) 143/79, Temp: 98.2 °F (36.8 °C), Heart Rate: 97, Resp: 15      PERTINENT ATTENDING PHYSICIAN COMMENTS:    Otherwise a healthy 76-year-old white male that presented to the emergency department that was personally seen and evaluated by myself in conjunction with Qi Allison physician assistant. Patient represented here with flulike symptoms. He stated the symptoms began on Wednesday. He has been seen here on 2 separate occasions since then 1 for dental pain which point he was placed on penicillin and then requested a COVID test 2 days ago with the fact that his 9month-old is currently hospitalized for coronavirus. Patient stated he has had subjective fever chills cough congestion abdominal pain nausea and 2 episodes of vomiting. There is no indication for any additional testing except repeating influenza and COVID testing which were negative. There is no indication for any admission or further evaluation or treatment here in the emergency department. Reassurance was given.        (Please note that portions of this note were completed with a voice recognition program.  Efforts were made to edit the dictations but occasionally words are mis-transcribed.)    Zay Guzman DO  Board Certified Emergency Medicine Physician       Zay Guzman DO  02/06/23 0049

## 2023-02-06 NOTE — ED TRIAGE NOTES
Pt to ED with c/o headache and body aches. Pt has known covid exposure but has tested negative so far. Pt c/o N/V today.

## 2023-02-07 ASSESSMENT — ENCOUNTER SYMPTOMS
TROUBLE SWALLOWING: 0
VOMITING: 0
FACIAL SWELLING: 0
NAUSEA: 0

## 2023-02-16 ENCOUNTER — OFFICE VISIT (OUTPATIENT)
Dept: INTERNAL MEDICINE CLINIC | Age: 28
End: 2023-02-16
Payer: MEDICAID

## 2023-02-16 ENCOUNTER — TELEPHONE (OUTPATIENT)
Dept: INTERNAL MEDICINE CLINIC | Age: 28
End: 2023-02-16

## 2023-02-16 VITALS
BODY MASS INDEX: 22.19 KG/M2 | DIASTOLIC BLOOD PRESSURE: 70 MMHG | OXYGEN SATURATION: 98 % | SYSTOLIC BLOOD PRESSURE: 120 MMHG | WEIGHT: 141.4 LBS | HEART RATE: 95 BPM | HEIGHT: 67 IN

## 2023-02-16 DIAGNOSIS — K04.7 DENTAL INFECTION: ICD-10-CM

## 2023-02-16 DIAGNOSIS — G03.9 MENINGITIS: Primary | ICD-10-CM

## 2023-02-16 DIAGNOSIS — F17.200 SMOKER: ICD-10-CM

## 2023-02-16 PROCEDURE — 99203 OFFICE O/P NEW LOW 30 MIN: CPT | Performed by: INTERNAL MEDICINE

## 2023-02-16 SDOH — ECONOMIC STABILITY: HOUSING INSECURITY
IN THE LAST 12 MONTHS, WAS THERE A TIME WHEN YOU DID NOT HAVE A STEADY PLACE TO SLEEP OR SLEPT IN A SHELTER (INCLUDING NOW)?: NO

## 2023-02-16 SDOH — ECONOMIC STABILITY: INCOME INSECURITY: HOW HARD IS IT FOR YOU TO PAY FOR THE VERY BASICS LIKE FOOD, HOUSING, MEDICAL CARE, AND HEATING?: NOT HARD AT ALL

## 2023-02-16 SDOH — ECONOMIC STABILITY: FOOD INSECURITY: WITHIN THE PAST 12 MONTHS, YOU WORRIED THAT YOUR FOOD WOULD RUN OUT BEFORE YOU GOT MONEY TO BUY MORE.: NEVER TRUE

## 2023-02-16 SDOH — ECONOMIC STABILITY: FOOD INSECURITY: WITHIN THE PAST 12 MONTHS, THE FOOD YOU BOUGHT JUST DIDN'T LAST AND YOU DIDN'T HAVE MONEY TO GET MORE.: NEVER TRUE

## 2023-02-16 ASSESSMENT — PATIENT HEALTH QUESTIONNAIRE - PHQ9
SUM OF ALL RESPONSES TO PHQ QUESTIONS 1-9: 0
SUM OF ALL RESPONSES TO PHQ9 QUESTIONS 1 & 2: 0
SUM OF ALL RESPONSES TO PHQ QUESTIONS 1-9: 0
SUM OF ALL RESPONSES TO PHQ QUESTIONS 1-9: 0
1. LITTLE INTEREST OR PLEASURE IN DOING THINGS: 0
SUM OF ALL RESPONSES TO PHQ QUESTIONS 1-9: 0
2. FEELING DOWN, DEPRESSED OR HOPELESS: 0

## 2023-02-16 ASSESSMENT — ENCOUNTER SYMPTOMS
ABDOMINAL PAIN: 0
ABDOMINAL DISTENTION: 0
BACK PAIN: 0
COUGH: 0
DIARRHEA: 0
APNEA: 0
FACIAL SWELLING: 0
CHEST TIGHTNESS: 0
CONSTIPATION: 0
COLOR CHANGE: 0
SHORTNESS OF BREATH: 0
WHEEZING: 0

## 2023-02-16 NOTE — PROGRESS NOTES
Subjective:      Patient ID: Jn Joyce is a 27 y.o. male.    HPI-patient is here to establish care.  He never diagnosed with chronic medical condition, does not take any medication on regular basis, he is a chronic smoker, patient was admitted recently to [facility], he was having intractable headache, neck pain, subjective fever, going on for a week or so, pending he presented to [facility]  He underwent CT head, underwent lumbar puncture, diagnosed with enterovirus meningitis.  Patient was seen by ID, neurologist, initially treated with broad-spectrum antibiotics, later on antibiotics were discontinued  His symptoms has completely resolved, no complaint of fever, headache, neck pain, fatigue, weakness, tiredness  He has dental infection, started on penicillin V at [facility], still taking this medication, feels the dental infection is improving, his insurance is approved recently, will follow-up with a dentist soon  No new complaints  Wanted to join back to his work    Review of Systems   Constitutional:  Negative for activity change, appetite change, chills and diaphoresis.   HENT:  Positive for dental problem. Negative for congestion, ear discharge, facial swelling and hearing loss.    Respiratory:  Negative for apnea, cough, chest tightness, shortness of breath and wheezing.    Cardiovascular:  Negative for chest pain and leg swelling.   Gastrointestinal:  Negative for abdominal distention, abdominal pain, constipation and diarrhea.   Genitourinary:  Negative for difficulty urinating, dysuria, enuresis, flank pain and frequency.   Musculoskeletal:  Negative for arthralgias, back pain, gait problem and joint swelling.   Skin:  Negative for color change, pallor and rash.   Neurological:  Negative for dizziness, seizures, facial asymmetry, light-headedness, numbness and headaches.   Psychiatric/Behavioral:  Negative for agitation, behavioral problems, confusion, decreased concentration and  dysphoric mood. Objective:   Physical Exam  Vitals and nursing note reviewed. Constitutional:       General: He is not in acute distress. Appearance: He is well-developed. He is not diaphoretic. HENT:      Head: Normocephalic and atraumatic. Comments: Poor dental hygiene. Mouth/Throat:      Pharynx: No oropharyngeal exudate. Eyes:      General: No scleral icterus. Right eye: No discharge. Left eye: No discharge. Conjunctiva/sclera: Conjunctivae normal.      Pupils: Pupils are equal, round, and reactive to light. Neck:      Thyroid: No thyromegaly. Vascular: No JVD. Trachea: No tracheal deviation. Cardiovascular:      Rate and Rhythm: Normal rate. Heart sounds: Normal heart sounds. No murmur heard. No gallop. Pulmonary:      Effort: Pulmonary effort is normal. No respiratory distress. Breath sounds: Normal breath sounds. No stridor. No wheezing or rales. Abdominal:      General: Bowel sounds are normal. There is no distension. Palpations: Abdomen is soft. Tenderness: There is no abdominal tenderness. There is no guarding or rebound. Musculoskeletal:         General: No tenderness. Normal range of motion. Cervical back: Normal range of motion and neck supple. Skin:     General: Skin is warm and dry. Findings: No erythema or rash. Neurological:      Mental Status: He is alert and oriented to person, place, and time.      Social History     Socioeconomic History    Marital status: Single     Spouse name: None    Number of children: None    Years of education: None    Highest education level: None   Tobacco Use    Smoking status: Former     Packs/day: 0.50     Types: Cigarettes    Smokeless tobacco: Never   Vaping Use    Vaping Use: Every day   Substance and Sexual Activity    Alcohol use: Yes     Comment: occ    Drug use: Yes     Types: Marijuana Valdene Anderson)     Comment: daily     Social Determinants of Health     Financial Resource Strain: Low Risk     Difficulty of Paying Living Expenses: Not hard at all   Food Insecurity: No Food Insecurity    Worried About Running Out of Food in the Last Year: Never true    Ran Out of Food in the Last Year: Never true   Transportation Needs: Unknown    Lack of Transportation (Non-Medical): No   Housing Stability: Unknown    Unstable Housing in the Last Year: No        No family history on file. Assessment / Plan:   1. Meningitis  Had lumbar monitor done recently, cultures suggestive of enterovirus, symptoms controlled    2. Smoker  Advised to cut down smoking, patient does not want to take medication to help with smoking    3. Dental infection  On penicillin V  We will make appointment with dentist soon      Return in about 3 months (around 5/16/2023). Reviewed prior labs and health maintenance. Discussed use, benefit, and side effects of prescribed medications. Barriers to medication compliance addressed. All patient questions answered. Pt voiced understanding. Violeta Nguyen MD  [facility]  2/16/2023, 12:05 PM    Please note that this chart was generated using voice recognition Dragon dictation software. Although every effort was made to ensure the accuracy of this automated transcription, some errors in transcription may have occurred. Visit Information    Have you changed or started any medications since your last visit including any over-the-counter medicines, vitamins, or herbal medicines? no   Are you having any side effects from any of your medications? -  no  Have you stopped taking any of your medications? Is so, why? -  no    Have you seen any other physician or provider since your last visit? No  Have you had any other diagnostic tests since your last visit? No  Have you been seen in the emergency room and/or had an admission to a hospital since we last saw you?  No  Have you had your routine dental cleaning in the past 6 months? no    Have you activated your MyChart account? If not, what are your barriers?  Yes     Patient Care Team:  Mona Granados MD as PCP - General (Internal Medicine)    Medical History Review  Past Medical, Family, and Social History reviewed and does contribute to the patient presenting condition    Health Maintenance   Topic Date Due    COVID-19 Vaccine (1) Never done    Depression Screen  Never done    HIV screen  Never done    Hepatitis C screen  Never done    Flu vaccine (1) Never done    DTaP/Tdap/Td vaccine (2 - Td or Tdap) 05/06/2031    Varicella vaccine  Completed    Hepatitis A vaccine  Aged Out    Hib vaccine  Aged Out    Meningococcal (ACWY) vaccine  Aged Out    Pneumococcal 0-64 years Vaccine  Aged Out

## 2024-05-09 ENCOUNTER — OFFICE VISIT (OUTPATIENT)
Dept: PRIMARY CARE CLINIC | Age: 29
End: 2024-05-09
Payer: MEDICAID

## 2024-05-09 ENCOUNTER — HOSPITAL ENCOUNTER (OUTPATIENT)
Age: 29
Setting detail: SPECIMEN
Discharge: HOME | End: 2024-05-09

## 2024-05-09 VITALS
WEIGHT: 144.4 LBS | DIASTOLIC BLOOD PRESSURE: 62 MMHG | BODY MASS INDEX: 22.66 KG/M2 | HEIGHT: 67 IN | HEART RATE: 76 BPM | SYSTOLIC BLOOD PRESSURE: 108 MMHG | OXYGEN SATURATION: 98 %

## 2024-05-09 DIAGNOSIS — R30.0 DYSURIA: ICD-10-CM

## 2024-05-09 DIAGNOSIS — R36.9 PENILE DISCHARGE: Primary | ICD-10-CM

## 2024-05-09 DIAGNOSIS — R36.9 PENILE DISCHARGE: ICD-10-CM

## 2024-05-09 LAB
BILIRUBIN, POC: NEGATIVE
BLOOD URINE, POC: NEGATIVE
CLARITY, POC: NORMAL
COLOR, POC: YELLOW
GLUCOSE URINE, POC: NEGATIVE
KETONES, POC: NEGATIVE
LEUKOCYTE EST, POC: NEGATIVE
NITRITE, POC: NORMAL
PH, POC: 7
PROTEIN, POC: NEGATIVE
SPECIFIC GRAVITY, POC: >=1.03
UROBILINOGEN, POC: NORMAL

## 2024-05-09 PROCEDURE — 81002 URINALYSIS NONAUTO W/O SCOPE: CPT | Performed by: PHYSICIAN ASSISTANT

## 2024-05-09 PROCEDURE — 99203 OFFICE O/P NEW LOW 30 MIN: CPT | Performed by: PHYSICIAN ASSISTANT

## 2024-05-09 RX ORDER — CEFTRIAXONE 500 MG/1
500 INJECTION, POWDER, FOR SOLUTION INTRAMUSCULAR; INTRAVENOUS ONCE
Status: COMPLETED | OUTPATIENT
Start: 2024-05-09 | End: 2024-05-09

## 2024-05-09 RX ORDER — DOXYCYCLINE HYCLATE 100 MG
100 TABLET ORAL 2 TIMES DAILY
Qty: 14 TABLET | Refills: 0 | Status: SHIPPED | OUTPATIENT
Start: 2024-05-09 | End: 2024-05-16

## 2024-05-09 RX ADMIN — CEFTRIAXONE 500 MG: 500 INJECTION, POWDER, FOR SOLUTION INTRAMUSCULAR; INTRAVENOUS at 10:17

## 2024-05-09 SDOH — ECONOMIC STABILITY: FOOD INSECURITY: WITHIN THE PAST 12 MONTHS, THE FOOD YOU BOUGHT JUST DIDN'T LAST AND YOU DIDN'T HAVE MONEY TO GET MORE.: NEVER TRUE

## 2024-05-09 SDOH — ECONOMIC STABILITY: FOOD INSECURITY: WITHIN THE PAST 12 MONTHS, YOU WORRIED THAT YOUR FOOD WOULD RUN OUT BEFORE YOU GOT MONEY TO BUY MORE.: NEVER TRUE

## 2024-05-09 SDOH — ECONOMIC STABILITY: INCOME INSECURITY: HOW HARD IS IT FOR YOU TO PAY FOR THE VERY BASICS LIKE FOOD, HOUSING, MEDICAL CARE, AND HEATING?: NOT HARD AT ALL

## 2024-05-09 ASSESSMENT — PATIENT HEALTH QUESTIONNAIRE - PHQ9
SUM OF ALL RESPONSES TO PHQ QUESTIONS 1-9: 0
SUM OF ALL RESPONSES TO PHQ QUESTIONS 1-9: 0
1. LITTLE INTEREST OR PLEASURE IN DOING THINGS: NOT AT ALL
SUM OF ALL RESPONSES TO PHQ QUESTIONS 1-9: 0
SUM OF ALL RESPONSES TO PHQ QUESTIONS 1-9: 0
2. FEELING DOWN, DEPRESSED OR HOPELESS: NOT AT ALL
SUM OF ALL RESPONSES TO PHQ9 QUESTIONS 1 & 2: 0

## 2024-05-09 NOTE — PROGRESS NOTES
[facility]  [facility]  [address] [place] [address]  [address]  [facility] [address]  Dept: [phone]  Dept Fax: [phone]    Jn Joyce is a 28 y.o. male who presents today for his medical conditions/complaints as noted below.    Chief Complaint   Patient presents with    New Patient       HPI:     Patient presents to the office to establish care.  No recent PCP.  He does not take daily medication for chronic medical limits.    Primary concern today is for possible urinary infection.  He reports for the last week or so he has been dealing with intermittent penile discharge, dysuria.  There has been an urge to urinate where he has to get to the restroom quickly.  He reports his partner was told she has an UTI.  Patient does have history of STI, chlamydia.  Denies any fevers or chills.  No abdominal or flank pain.  No issues with bowel movements.    No other acute changes or concerns.  Blood pressure stable.  Weight stable.        No results found for: \"LABA1C\"          ( goal A1C is < 7)   No components found for: \"LABMICR\"  No components found for: \"LDLCHOLESTEROL\", \"LDLCALC\"    (goal LDL is <100)   AST (U/L)   Date Value   01/28/2020 22     ALT (U/L)   Date Value   01/28/2020 10     BUN (mg/dL)   Date Value   02/05/2023 14     BP Readings from Last 3 Encounters:   05/09/24 108/62   02/16/23 120/70   02/06/23 (!) 143/79          (goal 120/80)    History reviewed. No pertinent past medical history.   Past Surgical History:   Procedure Laterality Date    ADENOIDECTOMY         History reviewed. No pertinent family history.    Social History     Tobacco Use    Smoking status: Every Day     Current packs/day: 0.50     Average packs/day: 0.5 packs/day for 13.4 years (6.7 ttl pk-yrs)     Types: Cigarettes     Start date: 2011    Smokeless tobacco: Never   Substance Use Topics    Alcohol use: Yes     Comment: occ      Current Outpatient Medications   Medication Sig Dispense Refill

## 2024-05-10 LAB
CHLAMYDIA DNA UR QL NAA+PROBE: ABNORMAL
MICROORGANISM SPEC CULT: NO GROWTH
N GONORRHOEA DNA UR QL NAA+PROBE: NEGATIVE
SPECIMEN DESCRIPTION: ABNORMAL
SPECIMEN DESCRIPTION: NORMAL

## 2024-05-13 ASSESSMENT — ENCOUNTER SYMPTOMS
SHORTNESS OF BREATH: 0
RHINORRHEA: 0
DIARRHEA: 0
NAUSEA: 0
ABDOMINAL PAIN: 0
BACK PAIN: 0

## 2025-03-28 ENCOUNTER — APPOINTMENT (OUTPATIENT)
Dept: CT IMAGING | Age: 30
End: 2025-03-28

## 2025-03-28 ENCOUNTER — HOSPITAL ENCOUNTER (EMERGENCY)
Age: 30
Discharge: HOME | End: 2025-03-28
Attending: EMERGENCY MEDICINE

## 2025-03-28 ENCOUNTER — TELEPHONE (OUTPATIENT)
Dept: GASTROENTEROLOGY | Age: 30
End: 2025-03-28

## 2025-03-28 VITALS
BODY MASS INDEX: 21.97 KG/M2 | DIASTOLIC BLOOD PRESSURE: 70 MMHG | SYSTOLIC BLOOD PRESSURE: 132 MMHG | TEMPERATURE: 98.2 F | HEIGHT: 67 IN | RESPIRATION RATE: 17 BRPM | WEIGHT: 140 LBS | OXYGEN SATURATION: 97 % | HEART RATE: 93 BPM

## 2025-03-28 DIAGNOSIS — K62.5 RECTAL BLEEDING: Primary | ICD-10-CM

## 2025-03-28 DIAGNOSIS — I71.02 DISSECTION OF ABDOMINAL AORTA (HCC): ICD-10-CM

## 2025-03-28 LAB
ANION GAP SERPL CALCULATED.3IONS-SCNC: 9 MMOL/L (ref 9–16)
BASOPHILS # BLD: 0 K/UL (ref 0–0.2)
BASOPHILS NFR BLD: 0 % (ref 0–2)
BUN SERPL-MCNC: 9 MG/DL (ref 6–20)
CALCIUM SERPL-MCNC: 9.1 MG/DL (ref 8.6–10.4)
CHLORIDE SERPL-SCNC: 106 MMOL/L (ref 98–107)
CO2 SERPL-SCNC: 26 MMOL/L (ref 20–31)
CREAT SERPL-MCNC: 0.8 MG/DL (ref 0.7–1.2)
EOSINOPHIL # BLD: 0.15 K/UL (ref 0–0.4)
EOSINOPHILS RELATIVE PERCENT: 3 % (ref 0–4)
ERYTHROCYTE [DISTWIDTH] IN BLOOD BY AUTOMATED COUNT: 12.5 % (ref 11.5–14.9)
GFR, ESTIMATED: >90 ML/MIN/1.73M2
GLUCOSE SERPL-MCNC: 107 MG/DL (ref 74–99)
HCT VFR BLD AUTO: 44.3 % (ref 41–53)
HGB BLD-MCNC: 15.4 G/DL (ref 13.5–17.5)
INR PPP: 0.9
LYMPHOCYTES NFR BLD: 1.75 K/UL (ref 1–4.8)
LYMPHOCYTES RELATIVE PERCENT: 35 % (ref 24–44)
MCH RBC QN AUTO: 31.8 PG (ref 26–34)
MCHC RBC AUTO-ENTMCNC: 34.8 G/DL (ref 31–37)
MCV RBC AUTO: 91.6 FL (ref 80–100)
MONOCYTES NFR BLD: 0.3 K/UL (ref 0.1–1.3)
MONOCYTES NFR BLD: 6 % (ref 1–7)
MORPHOLOGY: NORMAL
NEUTROPHILS NFR BLD: 56 % (ref 36–66)
NEUTS SEG NFR BLD: 2.8 K/UL (ref 1.3–9.1)
PARTIAL THROMBOPLASTIN TIME: 27.3 SEC (ref 24–36)
PLATELET # BLD AUTO: 177 K/UL (ref 150–450)
PMV BLD AUTO: 7.7 FL (ref 6–12)
POTASSIUM SERPL-SCNC: 3.9 MMOL/L (ref 3.7–5.3)
PROTHROMBIN TIME: 12.9 SEC (ref 11.8–14.6)
RBC # BLD AUTO: 4.84 M/UL (ref 4.5–5.9)
SODIUM SERPL-SCNC: 141 MMOL/L (ref 136–145)
WBC OTHER # BLD: 5 K/UL (ref 3.5–11)

## 2025-03-28 PROCEDURE — 96372 THER/PROPH/DIAG INJ SC/IM: CPT

## 2025-03-28 PROCEDURE — 36415 COLL VENOUS BLD VENIPUNCTURE: CPT

## 2025-03-28 PROCEDURE — 87491 CHLMYD TRACH DNA AMP PROBE: CPT

## 2025-03-28 PROCEDURE — 74174 CTA ABD&PLVS W/CONTRAST: CPT

## 2025-03-28 PROCEDURE — 2500000003 HC RX 250 WO HCPCS: Performed by: EMERGENCY MEDICINE

## 2025-03-28 PROCEDURE — 85730 THROMBOPLASTIN TIME PARTIAL: CPT

## 2025-03-28 PROCEDURE — 99285 EMERGENCY DEPT VISIT HI MDM: CPT

## 2025-03-28 PROCEDURE — 87591 N.GONORRHOEAE DNA AMP PROB: CPT

## 2025-03-28 PROCEDURE — 2580000003 HC RX 258: Performed by: EMERGENCY MEDICINE

## 2025-03-28 PROCEDURE — 6360000002 HC RX W HCPCS: Performed by: EMERGENCY MEDICINE

## 2025-03-28 PROCEDURE — 85025 COMPLETE CBC W/AUTO DIFF WBC: CPT

## 2025-03-28 PROCEDURE — 96360 HYDRATION IV INFUSION INIT: CPT

## 2025-03-28 PROCEDURE — 80048 BASIC METABOLIC PNL TOTAL CA: CPT

## 2025-03-28 PROCEDURE — 6360000004 HC RX CONTRAST MEDICATION: Performed by: EMERGENCY MEDICINE

## 2025-03-28 PROCEDURE — 85610 PROTHROMBIN TIME: CPT

## 2025-03-28 RX ORDER — 0.9 % SODIUM CHLORIDE 0.9 %
100 INTRAVENOUS SOLUTION INTRAVENOUS ONCE
Status: COMPLETED | OUTPATIENT
Start: 2025-03-28 | End: 2025-03-28

## 2025-03-28 RX ORDER — CEFTRIAXONE 500 MG/1
500 INJECTION, POWDER, FOR SOLUTION INTRAMUSCULAR; INTRAVENOUS ONCE
Status: COMPLETED | OUTPATIENT
Start: 2025-03-28 | End: 2025-03-28

## 2025-03-28 RX ORDER — SODIUM CHLORIDE 0.9 % (FLUSH) 0.9 %
10 SYRINGE (ML) INJECTION PRN
Status: DISCONTINUED | OUTPATIENT
Start: 2025-03-28 | End: 2025-03-28 | Stop reason: HOSPADM

## 2025-03-28 RX ORDER — CEFTRIAXONE 1 G/1
1000 INJECTION, POWDER, FOR SOLUTION INTRAMUSCULAR; INTRAVENOUS ONCE
Status: DISCONTINUED | OUTPATIENT
Start: 2025-03-28 | End: 2025-03-28

## 2025-03-28 RX ORDER — IOPAMIDOL 755 MG/ML
100 INJECTION, SOLUTION INTRAVASCULAR
Status: COMPLETED | OUTPATIENT
Start: 2025-03-28 | End: 2025-03-28

## 2025-03-28 RX ORDER — DOXYCYCLINE HYCLATE 100 MG
100 TABLET ORAL 2 TIMES DAILY
Qty: 14 TABLET | Refills: 0 | Status: SHIPPED | OUTPATIENT
Start: 2025-03-28 | End: 2025-04-04

## 2025-03-28 RX ADMIN — SODIUM CHLORIDE 100 ML: 9 INJECTION, SOLUTION INTRAVENOUS at 14:09

## 2025-03-28 RX ADMIN — CEFTRIAXONE SODIUM 500 MG: 500 INJECTION, POWDER, FOR SOLUTION INTRAMUSCULAR; INTRAVENOUS at 13:49

## 2025-03-28 RX ADMIN — SODIUM CHLORIDE, PRESERVATIVE FREE 10 ML: 5 INJECTION INTRAVENOUS at 14:09

## 2025-03-28 RX ADMIN — IOPAMIDOL 100 ML: 755 INJECTION, SOLUTION INTRAVENOUS at 14:09

## 2025-03-28 ASSESSMENT — ENCOUNTER SYMPTOMS
VOMITING: 0
TROUBLE SWALLOWING: 0
RHINORRHEA: 0
CONSTIPATION: 0
EYE REDNESS: 0
FACIAL SWELLING: 0
CHEST TIGHTNESS: 0
SHORTNESS OF BREATH: 0
EYE DISCHARGE: 0
COUGH: 0
NAUSEA: 0
SINUS PRESSURE: 0
BLOOD IN STOOL: 1
ABDOMINAL PAIN: 0
SORE THROAT: 0
BACK PAIN: 0
WHEEZING: 0
COLOR CHANGE: 0
EYE PAIN: 0
DIARRHEA: 0

## 2025-03-28 ASSESSMENT — PAIN SCALES - GENERAL: PAINLEVEL_OUTOF10: 0

## 2025-03-28 ASSESSMENT — PAIN - FUNCTIONAL ASSESSMENT: PAIN_FUNCTIONAL_ASSESSMENT: 0-10

## 2025-03-28 ASSESSMENT — LIFESTYLE VARIABLES
HOW MANY STANDARD DRINKS CONTAINING ALCOHOL DO YOU HAVE ON A TYPICAL DAY: PATIENT DOES NOT DRINK
HOW OFTEN DO YOU HAVE A DRINK CONTAINING ALCOHOL: NEVER

## 2025-03-28 NOTE — ED PROVIDER NOTES
eMERGENCY dEPARTMENT eNCOUnter      Pt Name: Jn Joyce  MRN: [id number]  Birthdate 1995  Date of evaluation: 3/28/25      CHIEF COMPLAINT       Chief Complaint   Patient presents with    Rectal Bleeding    Exposure to STD    Lightheadedness         HISTORY OF PRESENT ILLNESS    Jn Joyce is a 29 y.o. male who presents complaining of rectal bleeding.  Patient states that he had a bowel movement earlier today was normal.  Patient states that he ate lunch [place] had a bowel movement there and there was a lot of bright red blood in the toilet so he was concerned and came straight over.  Patient states that he is had no anal trauma no hemorrhoids that he knows of he is not on blood thinners and has never had anything like this before.  Patient's had no change in his stools and has had no pain.  Patient is also complaining for the last 2 weeks he has been noticing some discharge from his penis.  No lesions no swelling or pain to the testicles.  Patient is requesting testing and treatment for STDs.      REVIEW OF SYSTEMS       Review of Systems   Constitutional:  Negative for activity change, appetite change, chills, diaphoresis and fever.   HENT:  Negative for congestion, ear pain, facial swelling, nosebleeds, rhinorrhea, sinus pressure, sore throat and trouble swallowing.    Eyes:  Negative for pain, discharge and redness.   Respiratory:  Negative for cough, chest tightness, shortness of breath and wheezing.    Cardiovascular:  Negative for chest pain, palpitations and leg swelling.   Gastrointestinal:  Positive for blood in stool. Negative for abdominal pain, constipation, diarrhea, nausea and vomiting.   Genitourinary:  Positive for penile discharge. Negative for difficulty urinating, dysuria, flank pain, frequency, genital sores, hematuria, penile pain, penile swelling, scrotal swelling and testicular pain.   Musculoskeletal:  Negative for arthralgias, back pain, gait problem, joint swelling,

## 2025-03-31 LAB
CHLAMYDIA DNA UR QL NAA+PROBE: NEGATIVE
N GONORRHOEA DNA UR QL NAA+PROBE: NEGATIVE
SPECIMEN DESCRIPTION: NORMAL